# Patient Record
Sex: FEMALE | Race: OTHER | HISPANIC OR LATINO | URBAN - METROPOLITAN AREA
[De-identification: names, ages, dates, MRNs, and addresses within clinical notes are randomized per-mention and may not be internally consistent; named-entity substitution may affect disease eponyms.]

---

## 2018-10-14 ENCOUNTER — INPATIENT (INPATIENT)
Facility: HOSPITAL | Age: 83
LOS: 10 days | Discharge: HOME | DRG: 638 | End: 2018-10-25
Attending: INTERNAL MEDICINE | Admitting: INTERNAL MEDICINE
Payer: MEDICAID

## 2018-10-14 VITALS
OXYGEN SATURATION: 97 % | DIASTOLIC BLOOD PRESSURE: 52 MMHG | TEMPERATURE: 99 F | RESPIRATION RATE: 18 BRPM | SYSTOLIC BLOOD PRESSURE: 111 MMHG | HEART RATE: 80 BPM

## 2018-10-14 DIAGNOSIS — E11.9 TYPE 2 DIABETES MELLITUS WITHOUT COMPLICATIONS: ICD-10-CM

## 2018-10-14 DIAGNOSIS — Z29.9 ENCOUNTER FOR PROPHYLACTIC MEASURES, UNSPECIFIED: ICD-10-CM

## 2018-10-14 PROBLEM — Z00.00 ENCOUNTER FOR PREVENTIVE HEALTH EXAMINATION: Status: ACTIVE | Noted: 2018-10-14

## 2018-10-14 LAB
ACETONE SERPL-MCNC: NEGATIVE — SIGNIFICANT CHANGE UP
ALBUMIN SERPL ELPH-MCNC: 3.2 G/DL — LOW (ref 3.5–5)
ALP SERPL-CCNC: 154 U/L — HIGH (ref 40–120)
ALT FLD-CCNC: 29 U/L DA — SIGNIFICANT CHANGE UP (ref 10–60)
ANION GAP SERPL CALC-SCNC: 9 MMOL/L — SIGNIFICANT CHANGE UP (ref 5–17)
APPEARANCE UR: CLEAR — SIGNIFICANT CHANGE UP
AST SERPL-CCNC: 23 U/L — SIGNIFICANT CHANGE UP (ref 10–40)
BACTERIA # UR AUTO: ABNORMAL /HPF
BASOPHILS # BLD AUTO: 0 K/UL — SIGNIFICANT CHANGE UP (ref 0–0.2)
BASOPHILS NFR BLD AUTO: 0.6 % — SIGNIFICANT CHANGE UP (ref 0–2)
BILIRUB SERPL-MCNC: 0.3 MG/DL — SIGNIFICANT CHANGE UP (ref 0.2–1.2)
BILIRUB UR-MCNC: NEGATIVE — SIGNIFICANT CHANGE UP
BUN SERPL-MCNC: 19 MG/DL — HIGH (ref 7–18)
CALCIUM SERPL-MCNC: 8.9 MG/DL — SIGNIFICANT CHANGE UP (ref 8.4–10.5)
CHLORIDE SERPL-SCNC: 97 MMOL/L — SIGNIFICANT CHANGE UP (ref 96–108)
CO2 SERPL-SCNC: 26 MMOL/L — SIGNIFICANT CHANGE UP (ref 22–31)
COLOR SPEC: YELLOW — SIGNIFICANT CHANGE UP
CREAT SERPL-MCNC: 1.19 MG/DL — SIGNIFICANT CHANGE UP (ref 0.5–1.3)
DIFF PNL FLD: NEGATIVE — SIGNIFICANT CHANGE UP
EOSINOPHIL # BLD AUTO: 0.2 K/UL — SIGNIFICANT CHANGE UP (ref 0–0.5)
EOSINOPHIL NFR BLD AUTO: 2.1 % — SIGNIFICANT CHANGE UP (ref 0–6)
EPI CELLS # UR: SIGNIFICANT CHANGE UP /HPF
GLUCOSE SERPL-MCNC: 549 MG/DL — CRITICAL HIGH (ref 70–99)
GLUCOSE UR QL: 1000 MG/DL
HCT VFR BLD CALC: 46.4 % — HIGH (ref 34.5–45)
HGB BLD-MCNC: 15.4 G/DL — SIGNIFICANT CHANGE UP (ref 11.5–15.5)
KETONES UR-MCNC: NEGATIVE — SIGNIFICANT CHANGE UP
LEUKOCYTE ESTERASE UR-ACNC: NEGATIVE — SIGNIFICANT CHANGE UP
LYMPHOCYTES # BLD AUTO: 1.2 K/UL — SIGNIFICANT CHANGE UP (ref 1–3.3)
LYMPHOCYTES # BLD AUTO: 15.7 % — SIGNIFICANT CHANGE UP (ref 13–44)
MCHC RBC-ENTMCNC: 30.2 PG — SIGNIFICANT CHANGE UP (ref 27–34)
MCHC RBC-ENTMCNC: 33.3 GM/DL — SIGNIFICANT CHANGE UP (ref 32–36)
MCV RBC AUTO: 90.8 FL — SIGNIFICANT CHANGE UP (ref 80–100)
MONOCYTES # BLD AUTO: 0.5 K/UL — SIGNIFICANT CHANGE UP (ref 0–0.9)
MONOCYTES NFR BLD AUTO: 6.6 % — SIGNIFICANT CHANGE UP (ref 2–14)
NEUTROPHILS # BLD AUTO: 6 K/UL — SIGNIFICANT CHANGE UP (ref 1.8–7.4)
NEUTROPHILS NFR BLD AUTO: 75.1 % — SIGNIFICANT CHANGE UP (ref 43–77)
NITRITE UR-MCNC: NEGATIVE — SIGNIFICANT CHANGE UP
PH UR: 5 — SIGNIFICANT CHANGE UP (ref 5–8)
PLATELET # BLD AUTO: 203 K/UL — SIGNIFICANT CHANGE UP (ref 150–400)
POTASSIUM SERPL-MCNC: 4.8 MMOL/L — SIGNIFICANT CHANGE UP (ref 3.5–5.3)
POTASSIUM SERPL-SCNC: 4.8 MMOL/L — SIGNIFICANT CHANGE UP (ref 3.5–5.3)
PROT SERPL-MCNC: 7.3 G/DL — SIGNIFICANT CHANGE UP (ref 6–8.3)
PROT UR-MCNC: NEGATIVE — SIGNIFICANT CHANGE UP
RBC # BLD: 5.11 M/UL — SIGNIFICANT CHANGE UP (ref 3.8–5.2)
RBC # FLD: 11.7 % — SIGNIFICANT CHANGE UP (ref 10.3–14.5)
RBC CASTS # UR COMP ASSIST: SIGNIFICANT CHANGE UP /HPF (ref 0–2)
SODIUM SERPL-SCNC: 132 MMOL/L — LOW (ref 135–145)
SP GR SPEC: 1.01 — SIGNIFICANT CHANGE UP (ref 1.01–1.02)
UROBILINOGEN FLD QL: NEGATIVE — SIGNIFICANT CHANGE UP
WBC # BLD: 8 K/UL — SIGNIFICANT CHANGE UP (ref 3.8–10.5)
WBC # FLD AUTO: 8 K/UL — SIGNIFICANT CHANGE UP (ref 3.8–10.5)
WBC UR QL: SIGNIFICANT CHANGE UP /HPF (ref 0–5)

## 2018-10-14 PROCEDURE — 71045 X-RAY EXAM CHEST 1 VIEW: CPT | Mod: 26

## 2018-10-14 PROCEDURE — 99285 EMERGENCY DEPT VISIT HI MDM: CPT | Mod: 25

## 2018-10-14 RX ORDER — DEXTROSE 50 % IN WATER 50 %
15 SYRINGE (ML) INTRAVENOUS ONCE
Qty: 0 | Refills: 0 | Status: DISCONTINUED | OUTPATIENT
Start: 2018-10-14 | End: 2018-10-25

## 2018-10-14 RX ORDER — DEXTROSE 50 % IN WATER 50 %
12.5 SYRINGE (ML) INTRAVENOUS ONCE
Qty: 0 | Refills: 0 | Status: DISCONTINUED | OUTPATIENT
Start: 2018-10-14 | End: 2018-10-25

## 2018-10-14 RX ORDER — SODIUM CHLORIDE 9 MG/ML
1000 INJECTION INTRAMUSCULAR; INTRAVENOUS; SUBCUTANEOUS
Qty: 0 | Refills: 0 | Status: DISCONTINUED | OUTPATIENT
Start: 2018-10-14 | End: 2018-10-15

## 2018-10-14 RX ORDER — DEXTROSE 50 % IN WATER 50 %
25 SYRINGE (ML) INTRAVENOUS ONCE
Qty: 0 | Refills: 0 | Status: DISCONTINUED | OUTPATIENT
Start: 2018-10-14 | End: 2018-10-25

## 2018-10-14 RX ORDER — ENOXAPARIN SODIUM 100 MG/ML
40 INJECTION SUBCUTANEOUS ONCE
Qty: 0 | Refills: 0 | Status: COMPLETED | OUTPATIENT
Start: 2018-10-14 | End: 2018-10-15

## 2018-10-14 RX ORDER — SODIUM CHLORIDE 9 MG/ML
1000 INJECTION, SOLUTION INTRAVENOUS
Qty: 0 | Refills: 0 | Status: DISCONTINUED | OUTPATIENT
Start: 2018-10-14 | End: 2018-10-25

## 2018-10-14 RX ORDER — INSULIN LISPRO 100/ML
VIAL (ML) SUBCUTANEOUS AT BEDTIME
Qty: 0 | Refills: 0 | Status: DISCONTINUED | OUTPATIENT
Start: 2018-10-14 | End: 2018-10-18

## 2018-10-14 RX ORDER — GLUCAGON INJECTION, SOLUTION 0.5 MG/.1ML
1 INJECTION, SOLUTION SUBCUTANEOUS ONCE
Qty: 0 | Refills: 0 | Status: DISCONTINUED | OUTPATIENT
Start: 2018-10-14 | End: 2018-10-25

## 2018-10-14 RX ORDER — INSULIN LISPRO 100/ML
VIAL (ML) SUBCUTANEOUS
Qty: 0 | Refills: 0 | Status: DISCONTINUED | OUTPATIENT
Start: 2018-10-14 | End: 2018-10-25

## 2018-10-14 RX ORDER — INSULIN LISPRO 100/ML
10 VIAL (ML) SUBCUTANEOUS ONCE
Qty: 0 | Refills: 0 | Status: COMPLETED | OUTPATIENT
Start: 2018-10-14 | End: 2018-10-14

## 2018-10-14 RX ORDER — SODIUM CHLORIDE 9 MG/ML
1000 INJECTION INTRAMUSCULAR; INTRAVENOUS; SUBCUTANEOUS ONCE
Qty: 0 | Refills: 0 | Status: COMPLETED | OUTPATIENT
Start: 2018-10-14 | End: 2018-10-14

## 2018-10-14 RX ADMIN — SODIUM CHLORIDE 4000 MILLILITER(S): 9 INJECTION INTRAMUSCULAR; INTRAVENOUS; SUBCUTANEOUS at 19:15

## 2018-10-14 RX ADMIN — Medication 10 UNIT(S): at 20:55

## 2018-10-14 NOTE — H&P ADULT - NSHPSOCIALHISTORY_GEN_ALL_CORE
Pt denies tobacco, alcohol, or recreational drug use Pt reports 40pk year smoking history- quit 20 years prior  Pt denies alcohol, or recreational drug use

## 2018-10-14 NOTE — H&P ADULT - PROBLEM SELECTOR PLAN 2
[] Previous VTE                                                3  [] Thrombophilia                                             2  [] Lower limb paralysis                                   2    [] Current Cancer                                             2   [x] Immobilization > 24 hrs                              1  [] ICU/CCU stay > 24 hours                             1  [x] Age > 60                                                         1    IMPROVE VTE Score: 2; Lovenox sub q for DVT prophy -c/w atenolol therapy- 100mg daily  -Monitor BP  -Dash diet

## 2018-10-14 NOTE — H&P ADULT - PROBLEM SELECTOR PLAN 1
-Hyperglycemic to 500's on admission  -s/p Humalog 10 units in ED  -Corrected AG  -Monitor FS q 2 for now -Hyperglycemic to 500's on admission  -s/p Humalog 10 units in ED  -Corrected A  -Monitor FS q 2 for now  -Na of 132 2/2 hypoglycemia--> corrected 136  -c/w Sliding Scale, will administer 10 lantus HS   -Diabetic Diet  -f/u HbA1c  -Endo: Dr. Miller -Hyperglycemic to 500's on admission  -s/p Humalog 10 units in ED  -Corrected A  -Monitor FS q 2 for now  -Na of 132 2/2 hypoglycemia--> corrected 136  -c/w Sliding Scale, will administer 5 lantus HS   -c/w IV hydration  -Diabetic Diet  -f/u HbA1c  -f/u AM BMP  -Endo: Dr. Miller -Hyperglycemic to 500's on admission  -s/p Humalog 10 units in ED  -Corrected A  -Monitor FS q 2 for now  -Na of 132 2/2 hypoglycemia--> corrected 136  -c/w Sliding Scale, will administer 5 lantus HS   -c/w IV hydration  -Diabetic Diet  -f/u HbA1c  -f/u AM BMP  -Endo: Dr. Baron

## 2018-10-14 NOTE — ED PROVIDER NOTE - CARE PLAN
Principal Discharge DX:	Type 2 diabetes mellitus with hyperglycemia, without long-term current use of insulin

## 2018-10-14 NOTE — H&P ADULT - PROBLEM SELECTOR PLAN 4
[] Previous VTE                                                3  [] Thrombophilia                                             2  [] Lower limb paralysis                                   2    [] Current Cancer                                             2   [x] Immobilization > 24 hrs                              1  [] ICU/CCU stay > 24 hours                             1  [x] Age > 60                                                         1    IMPROVE VTE Score: 2; Lovenox sub q for DVT prophy

## 2018-10-14 NOTE — H&P ADULT - NSHPPHYSICALEXAM_GEN_ALL_CORE
Vital Signs Last 24 Hrs  T(C): 36.7 (14 Oct 2018 19:26), Max: 37.1 (14 Oct 2018 17:39)  T(F): 98 (14 Oct 2018 19:26), Max: 98.7 (14 Oct 2018 17:39)  HR: 91 (14 Oct 2018 19:26) (80 - 91)  BP: 112/61 (14 Oct 2018 19:26) (111/52 - 112/61)  RR: 18 (14 Oct 2018 19:26) (18 - 18)  SpO2: 98% (14 Oct 2018 19:26) (97% - 98%)

## 2018-10-14 NOTE — ED ADULT NURSE NOTE - NSIMPLEMENTINTERV_GEN_ALL_ED
Implemented All Fall Risk Interventions:  Central to call system. Call bell, personal items and telephone within reach. Instruct patient to call for assistance. Room bathroom lighting operational. Non-slip footwear when patient is off stretcher. Physically safe environment: no spills, clutter or unnecessary equipment. Stretcher in lowest position, wheels locked, appropriate side rails in place. Provide visual cue, wrist band, yellow gown, etc. Monitor gait and stability. Monitor for mental status changes and reorient to person, place, and time. Review medications for side effects contributing to fall risk. Reinforce activity limits and safety measures with patient and family.

## 2018-10-14 NOTE — ED PROVIDER NOTE - OBJECTIVE STATEMENT
84 y/o F pt with a PMHx of DM type II (oral medication no insulin) presents to the ED with complaints of elevated blood glucose at home. Patient states shortness of breath, chest pain, urinary urgency, urinary frequency or any other complaints. NKDA. 82 y/o F pt with a PMHx of DM type II (oral medication no insulin) presents to the ED with complaints of elevated blood glucose at home. Patient states  no shortness of breath, chest pain, urinary urgency, urinary frequency or any other complaints. NKDA.

## 2018-10-14 NOTE — ED ADULT NURSE NOTE - ED STAT RN HANDOFF DETAILS
Fingerstick 78 mg/dl  aware pt.remained  stable. denies  pain. Fingerstick 78 mg/dl  aware with  no new order made, pt.is asymptomatic. no s/s  of hypo and  hyperglycemia  noted. re-check FSBS 121 mg/dl.endorsed  to dia evangelista.pt.not   in distress

## 2018-10-14 NOTE — H&P ADULT - RS GEN PE MLT RESP DETAILS PC
good air movement/no rhonchi/no wheezes/respirations non-labored/clear to auscultation bilaterally/airway patent/no rales/breath sounds equal

## 2018-10-14 NOTE — H&P ADULT - NEUROLOGICAL DETAILS
cranial nerves intact/responds to pain/responds to verbal commands/alert and oriented x 3/sensation intact

## 2018-10-14 NOTE — H&P ADULT - HISTORY OF PRESENT ILLNESS
Pt is an 82 y/o F, from home, with a PMHx of DM type II (oral medication no insulin) who presents to the ED with complaints of elevated blood glucose at home. Pt is an 84 y/o F, from home, with a PMHx of DM type II (oral medication no insulin) who presents to the ED with complaints of elevated blood glucose at home.  Pt is a poor historian and history obtained over the phone from daughter, Chantal, who notes blood glucose was elevated yesterday in the 300's.  Pt continued to take her PO glipized as scheduled.  This AM her glucose was 340 before breakfast.  Pt also complained of urinary urgency and dysuria.  Daughter also notes pt has been drinking a lot of water lately.  Pt denies HA, blurred vision, diaphoresis, weakness, loss of apetite, lethargy/ confusion, or dizziness.  Pt last saw her PCP, Dr. Zarate, 1 mo prior and blood sugars were fine at that time.  Pt was switched from metformin to Glipizide 6mo prior, and sugars have been well controlled.  Pt is compliant with medication.  Pt's diet is poor.      In the ED, pt presents in no acute distress, and vitals WNL

## 2018-10-14 NOTE — H&P ADULT - ASSESSMENT
Pt is an 82 y/o F, from home, with a PMHx of DM type II (oral medication no insulin) who presents to the ED with complaints of elevated blood glucose at home.  In the ED, pt presents in no acute distress, and vitals WNL.  Pt remains afebrile w/o leukocytosis.  Labs sig for glucose of 534. Pt is an 84 y/o F, from home, with a PMHx of DM type II (oral medication no insulin) who presents to the ED with complaints of elevated blood glucose at home.  In the ED, pt presents in no acute distress, and vitals WNL.  Pt remains afebrile w/o leukocytosis.  Labs sig for glucose of 534.      Pt will be admitted to medicine for management of hyperglycemia

## 2018-10-14 NOTE — ED PROVIDER NOTE - MEDICAL DECISION MAKING DETAILS
UTI as a cause of worsening hyperglycemia. Not on insulin and not likely to require fluids and insulin. Likely admission.

## 2018-10-15 DIAGNOSIS — E11.65 TYPE 2 DIABETES MELLITUS WITH HYPERGLYCEMIA: ICD-10-CM

## 2018-10-15 DIAGNOSIS — E78.5 HYPERLIPIDEMIA, UNSPECIFIED: ICD-10-CM

## 2018-10-15 DIAGNOSIS — I10 ESSENTIAL (PRIMARY) HYPERTENSION: ICD-10-CM

## 2018-10-15 LAB
ANION GAP SERPL CALC-SCNC: 7 MMOL/L — SIGNIFICANT CHANGE UP (ref 5–17)
BUN SERPL-MCNC: 13 MG/DL — SIGNIFICANT CHANGE UP (ref 7–18)
CALCIUM SERPL-MCNC: 8.4 MG/DL — SIGNIFICANT CHANGE UP (ref 8.4–10.5)
CHLORIDE SERPL-SCNC: 110 MMOL/L — HIGH (ref 96–108)
CHOLEST SERPL-MCNC: 178 MG/DL — SIGNIFICANT CHANGE UP (ref 10–199)
CO2 SERPL-SCNC: 25 MMOL/L — SIGNIFICANT CHANGE UP (ref 22–31)
CREAT SERPL-MCNC: 0.77 MG/DL — SIGNIFICANT CHANGE UP (ref 0.5–1.3)
CULTURE RESULTS: NO GROWTH — SIGNIFICANT CHANGE UP
GLUCOSE BLDC GLUCOMTR-MCNC: 121 MG/DL — HIGH (ref 70–99)
GLUCOSE BLDC GLUCOMTR-MCNC: 126 MG/DL — HIGH (ref 70–99)
GLUCOSE BLDC GLUCOMTR-MCNC: 138 MG/DL — HIGH (ref 70–99)
GLUCOSE BLDC GLUCOMTR-MCNC: 164 MG/DL — HIGH (ref 70–99)
GLUCOSE BLDC GLUCOMTR-MCNC: 78 MG/DL — SIGNIFICANT CHANGE UP (ref 70–99)
GLUCOSE SERPL-MCNC: 121 MG/DL — HIGH (ref 70–99)
HBA1C BLD-MCNC: 15 % — HIGH (ref 4–5.6)
HCT VFR BLD CALC: 41.5 % — SIGNIFICANT CHANGE UP (ref 34.5–45)
HDLC SERPL-MCNC: 36 MG/DL — LOW
HGB BLD-MCNC: 13.9 G/DL — SIGNIFICANT CHANGE UP (ref 11.5–15.5)
LIPID PNL WITH DIRECT LDL SERPL: 114 MG/DL — SIGNIFICANT CHANGE UP
MAGNESIUM SERPL-MCNC: 1.7 MG/DL — SIGNIFICANT CHANGE UP (ref 1.6–2.6)
MCHC RBC-ENTMCNC: 30.6 PG — SIGNIFICANT CHANGE UP (ref 27–34)
MCHC RBC-ENTMCNC: 33.6 GM/DL — SIGNIFICANT CHANGE UP (ref 32–36)
MCV RBC AUTO: 91.3 FL — SIGNIFICANT CHANGE UP (ref 80–100)
PHOSPHATE SERPL-MCNC: 3 MG/DL — SIGNIFICANT CHANGE UP (ref 2.5–4.5)
PLATELET # BLD AUTO: 190 K/UL — SIGNIFICANT CHANGE UP (ref 150–400)
POTASSIUM SERPL-MCNC: 5 MMOL/L — SIGNIFICANT CHANGE UP (ref 3.5–5.3)
POTASSIUM SERPL-SCNC: 5 MMOL/L — SIGNIFICANT CHANGE UP (ref 3.5–5.3)
RBC # BLD: 4.54 M/UL — SIGNIFICANT CHANGE UP (ref 3.8–5.2)
RBC # FLD: 11.9 % — SIGNIFICANT CHANGE UP (ref 10.3–14.5)
SODIUM SERPL-SCNC: 142 MMOL/L — SIGNIFICANT CHANGE UP (ref 135–145)
SPECIMEN SOURCE: SIGNIFICANT CHANGE UP
TOTAL CHOLESTEROL/HDL RATIO MEASUREMENT: 4.9 RATIO — SIGNIFICANT CHANGE UP (ref 3.3–7.1)
TRIGL SERPL-MCNC: 138 MG/DL — SIGNIFICANT CHANGE UP (ref 10–149)
TSH SERPL-MCNC: 0.94 UU/ML — SIGNIFICANT CHANGE UP (ref 0.34–4.82)
VIT B12 SERPL-MCNC: 310 PG/ML — SIGNIFICANT CHANGE UP (ref 232–1245)
WBC # BLD: 8.9 K/UL — SIGNIFICANT CHANGE UP (ref 3.8–10.5)
WBC # FLD AUTO: 8.9 K/UL — SIGNIFICANT CHANGE UP (ref 3.8–10.5)

## 2018-10-15 RX ORDER — SIMVASTATIN 20 MG/1
1 TABLET, FILM COATED ORAL
Qty: 0 | Refills: 0 | COMMUNITY

## 2018-10-15 RX ORDER — SIMVASTATIN 20 MG/1
20 TABLET, FILM COATED ORAL AT BEDTIME
Qty: 0 | Refills: 0 | Status: DISCONTINUED | OUTPATIENT
Start: 2018-10-15 | End: 2018-10-25

## 2018-10-15 RX ORDER — ENOXAPARIN SODIUM 100 MG/ML
40 INJECTION SUBCUTANEOUS DAILY
Qty: 0 | Refills: 0 | Status: DISCONTINUED | OUTPATIENT
Start: 2018-10-15 | End: 2018-10-25

## 2018-10-15 RX ORDER — ATENOLOL 25 MG/1
50 TABLET ORAL DAILY
Qty: 0 | Refills: 0 | Status: DISCONTINUED | OUTPATIENT
Start: 2018-10-15 | End: 2018-10-25

## 2018-10-15 RX ORDER — INSULIN GLARGINE 100 [IU]/ML
5 INJECTION, SOLUTION SUBCUTANEOUS AT BEDTIME
Qty: 0 | Refills: 0 | Status: DISCONTINUED | OUTPATIENT
Start: 2018-10-15 | End: 2018-10-16

## 2018-10-15 RX ORDER — ATENOLOL 25 MG/1
1 TABLET ORAL
Qty: 0 | Refills: 0 | COMMUNITY

## 2018-10-15 RX ADMIN — ENOXAPARIN SODIUM 40 MILLIGRAM(S): 100 INJECTION SUBCUTANEOUS at 01:00

## 2018-10-15 RX ADMIN — SIMVASTATIN 20 MILLIGRAM(S): 20 TABLET, FILM COATED ORAL at 23:03

## 2018-10-15 RX ADMIN — Medication 2: at 01:00

## 2018-10-15 RX ADMIN — ENOXAPARIN SODIUM 40 MILLIGRAM(S): 100 INJECTION SUBCUTANEOUS at 16:03

## 2018-10-15 RX ADMIN — SODIUM CHLORIDE 150 MILLILITER(S): 9 INJECTION INTRAMUSCULAR; INTRAVENOUS; SUBCUTANEOUS at 02:17

## 2018-10-15 RX ADMIN — INSULIN GLARGINE 5 UNIT(S): 100 INJECTION, SOLUTION SUBCUTANEOUS at 01:04

## 2018-10-15 NOTE — PROGRESS NOTE ADULT - SUBJECTIVE AND OBJECTIVE BOX
Pt is an 82 y/o F, from home, with a PMHx of DM type II (oral medication no insulin) who presents to the ED with complaints of elevated blood glucose at home.  Pt is a poor historian and history obtained over the phone from daughter, Chantal, who notes blood glucose was elevated yesterday in the 300's.  Pt continued to take her PO glipized as scheduled.  This AM her glucose was 340 before breakfast.  Pt also complained of urinary urgency and dysuria.  Daughter also notes pt has been drinking a lot of water lately.  Pt denies HA, blurred vision, diaphoresis, weakness, loss of apetite, lethargy/ confusion, or dizziness.  Pt last saw her PCP, Dr. aZrate, 1 mo prior and blood sugars were fine at that time.  Pt was switched from metformin to Glipizide 6mo prior, and sugars have been well controlled.  Pt is compliant with medication.  Pt's diet is poor.      In the ED, pt presents in no acute distress, and vitals WNL    Review of Systems:  Other Review of Systems: All other review of systems negative, except as noted in HPI	      pt seen in ed [x  ], reg med floor [   ], bed [ x ], chair at bedside [   ], a+o x3 [ x ], lethargic [  ],  nad [ x ]      Allergies    No Known Allergies        Vitals    T(F): 98.5 (10-15-18 @ 11:03), Max: 98.7 (10-14-18 @ 17:39)  HR: 66 (10-15-18 @ 11:03) (60 - 91)  BP: 102/49 (10-15-18 @ 11:03) (90/48 - 112/61)  RR: 18 (10-15-18 @ 11:03) (18 - 18)  SpO2: 97% (10-15-18 @ 11:03) (97% - 100%)  Wt(kg): --  CAPILLARY BLOOD GLUCOSE      POCT Blood Glucose.: 138 mg/dL (15 Oct 2018 11:13)      Labs                          13.9   8.9   )-----------( 190      ( 15 Oct 2018 06:47 )             41.5       10-15    142  |  110<H>  |  13  ----------------------------<  121<H>  5.0   |  25  |  0.77    Ca    8.4      15 Oct 2018 06:47  Phos  3.0     10-15  Mg     1.7     10-15    TPro  7.3  /  Alb  3.2<L>  /  TBili  0.3  /  DBili  x   /  AST  23  /  ALT  29  /  AlkPhos  154<H>  10-14                Radiology Results      Meds    MEDICATIONS  (STANDING):  ATENolol  Tablet 50 milliGRAM(s) Oral daily  dextrose 5%. 1000 milliLiter(s) (50 mL/Hr) IV Continuous <Continuous>  dextrose 50% Injectable 12.5 Gram(s) IV Push once  dextrose 50% Injectable 25 Gram(s) IV Push once  dextrose 50% Injectable 25 Gram(s) IV Push once  enoxaparin Injectable 40 milliGRAM(s) SubCutaneous daily  insulin glargine Injectable (LANTUS) 5 Unit(s) SubCutaneous at bedtime  insulin lispro (HumaLOG) corrective regimen sliding scale   SubCutaneous three times a day before meals  insulin lispro (HumaLOG) corrective regimen sliding scale   SubCutaneous at bedtime  simvastatin 20 milliGRAM(s) Oral at bedtime  sodium chloride 0.9%. 1000 milliLiter(s) (150 mL/Hr) IV Continuous <Continuous>      MEDICATIONS  (PRN):  dextrose 40% Gel 15 Gram(s) Oral once PRN Blood Glucose LESS THAN 70 milliGRAM(s)/deciLiter  glucagon  Injectable 1 milliGRAM(s) IntraMuscular once PRN Glucose <70 milliGRAM(s)/deciLiter      Physical Exam    Neuro :  no focal deficits  Respiratory: CTA B/L  CV: RRR, S1S2, no murmurs,   Abdominal: Soft, NT, ND +BS,  Extremities: No edema, + peripheral pulses    ASSESSMENT    Type 2 diabetes mellitus with hyperglycemia  DM type 2 (diabetes mellitus, type 2)  No significant past surgical history      PLAN Pt is an 82 y/o F, from home, with a PMHx of DM type II (oral medication no insulin) who presents to the ED with complaints of elevated blood glucose at home.  Pt is a poor historian and history obtained over the phone from daughter, Chantal, who notes blood glucose was elevated yesterday in the 300's.  Pt continued to take her PO glipized as scheduled.  This AM her glucose was 340 before breakfast.  Pt also complained of urinary urgency and dysuria.  Daughter also notes pt has been drinking a lot of water lately.  Pt denies HA, blurred vision, diaphoresis, weakness, loss of apetite, lethargy/ confusion, or dizziness.  Pt last saw her PCP, Dr. Zarate, 1 mo prior and blood sugars were fine at that time.  Pt was switched from metformin to Glipizide 6mo prior, and sugars have been well controlled.  Pt is compliant with medication.  Pt's diet is poor.      In the ED, pt presents in no acute distress, and vitals WNL    Review of Systems:  Other Review of Systems: All other review of systems negative, except as noted in HPI	      pt seen in ed [x  ], reg med floor [   ], bed [ x ], chair at bedside [   ], a+o x3 [ x ], lethargic [  ],  nad [ x ]      Allergies    No Known Allergies        Vitals    T(F): 98.5 (10-15-18 @ 11:03), Max: 98.7 (10-14-18 @ 17:39)  HR: 66 (10-15-18 @ 11:03) (60 - 91)  BP: 102/49 (10-15-18 @ 11:03) (90/48 - 112/61)  RR: 18 (10-15-18 @ 11:03) (18 - 18)  SpO2: 97% (10-15-18 @ 11:03) (97% - 100%)  Wt(kg): --  CAPILLARY BLOOD GLUCOSE      POCT Blood Glucose.: 138 mg/dL (15 Oct 2018 11:13)      Labs                          13.9   8.9   )-----------( 190      ( 15 Oct 2018 06:47 )             41.5       10-15    142  |  110<H>  |  13  ----------------------------<  121<H>  5.0   |  25  |  0.77    Ca    8.4      15 Oct 2018 06:47  Phos  3.0     10-15  Mg     1.7     10-15    TPro  7.3  /  Alb  3.2<L>  /  TBili  0.3  /  DBili  x   /  AST  23  /  ALT  29  /  AlkPhos  154<H>  10-14                Radiology Results      Meds    MEDICATIONS  (STANDING):  ATENolol  Tablet 50 milliGRAM(s) Oral daily  dextrose 5%. 1000 milliLiter(s) (50 mL/Hr) IV Continuous <Continuous>  dextrose 50% Injectable 12.5 Gram(s) IV Push once  dextrose 50% Injectable 25 Gram(s) IV Push once  dextrose 50% Injectable 25 Gram(s) IV Push once  enoxaparin Injectable 40 milliGRAM(s) SubCutaneous daily  insulin glargine Injectable (LANTUS) 5 Unit(s) SubCutaneous at bedtime  insulin lispro (HumaLOG) corrective regimen sliding scale   SubCutaneous three times a day before meals  insulin lispro (HumaLOG) corrective regimen sliding scale   SubCutaneous at bedtime  simvastatin 20 milliGRAM(s) Oral at bedtime  sodium chloride 0.9%. 1000 milliLiter(s) (150 mL/Hr) IV Continuous <Continuous>      MEDICATIONS  (PRN):  dextrose 40% Gel 15 Gram(s) Oral once PRN Blood Glucose LESS THAN 70 milliGRAM(s)/deciLiter  glucagon  Injectable 1 milliGRAM(s) IntraMuscular once PRN Glucose <70 milliGRAM(s)/deciLiter      Physical Exam    Neuro :  no focal deficits  Respiratory: CTA B/L  CV: RRR, S1S2, no murmurs,   Abdominal: Soft, NT, ND +BS,  Extremities: No edema, + peripheral pulses    ASSESSMENT    uncontrolled dm  h/o DM type 2 (diabetes mellitus, type 2)  No significant past surgical history      PLAN    cont lantus and humalog  endo cons  serum glucose improving  cont current meds

## 2018-10-15 NOTE — ED ADULT NURSE REASSESSMENT NOTE - NS ED NURSE REASSESS COMMENT FT1
receive pt awake alert oriented not in distress with saline lock intact no redness no swelling noted with daughter at  bedside.waiting for bed.

## 2018-10-15 NOTE — CONSULT NOTE ADULT - SUBJECTIVE AND OBJECTIVE BOX
diabetes consult  83 yr h/f with h/o diabetes  admitted with severe hyperglycemia  started on fluids/insulin  initial hpi from residents note  HPI:     Pt is an 84 y/o F, from home, with a PMHx of DM type II (oral medication no insulin) who presents to the ED with complaints of elevated blood glucose at home.  Pt is a poor historian and history obtained over the phone from daughter, Chantal, who notes blood glucose was elevated yesterday in the 300's.  Pt continued to take her PO glipized as scheduled.  This AM her glucose was 340 before breakfast.  Pt also complained of urinary urgency and dysuria.  Daughter also notes pt has been drinking a lot of water lately.  Pt denies HA, blurred vision, diaphoresis, weakness, loss of apetite, lethargy/ confusion, or dizziness.  Pt last saw her PCP, Dr. Zarate, 1 mo prior and blood sugars were fine at that time.  Pt was switched from metformin to Glipizide 6mo prior, and sugars have been well controlled.  Pt is compliant with medication.  Pt's diet is poor.      In the ED, pt presents in no acute distress, and vitals WNL (14 Oct 2018 21:02)      PAST MEDICAL & SURGICAL HISTORY:  DM type 2 (diabetes mellitus, type 2)  No significant past surgical history      No Known Allergies      ATENolol  Tablet 50 milliGRAM(s) Oral daily  dextrose 40% Gel 15 Gram(s) Oral once PRN  dextrose 5%. 1000 milliLiter(s) IV Continuous <Continuous>  dextrose 50% Injectable 12.5 Gram(s) IV Push once  dextrose 50% Injectable 25 Gram(s) IV Push once  dextrose 50% Injectable 25 Gram(s) IV Push once  enoxaparin Injectable 40 milliGRAM(s) SubCutaneous daily  glucagon  Injectable 1 milliGRAM(s) IntraMuscular once PRN  insulin glargine Injectable (LANTUS) 5 Unit(s) SubCutaneous at bedtime  insulin lispro (HumaLOG) corrective regimen sliding scale   SubCutaneous three times a day before meals  insulin lispro (HumaLOG) corrective regimen sliding scale   SubCutaneous at bedtime  simvastatin 20 milliGRAM(s) Oral at bedtime      Social Hx:    FAMILY HISTORY:  Family history of diabetes mellitus (Sibling)      REVIEW OF SYSTEMS:  /WT CHANGE?  GM ?FAMILY DAUGHTER  diet ?dependent      CONSTITUTIONAL: No weakness, fevers or chills  EYES/ENT: No visual changes;  No vertigo or throat pain   NECK: No pain or stiffness  RESPIRATORY: No cough, wheezing, hemoptysis; No shortness of breath  CARDIOVASCULAR: No chest pain or palpitations  GASTROINTESTINAL: No abdominal or epigastric pain. No nausea, vomiting, or hematemesis; No diarrhea or constipation. No melena or hematochezia.  GENITOURINARY: No dysuria, frequency or hematuria  NEUROLOGICAL: No numbness or weakness  SKIN: No itching, burning, rashes, or lesions   All other review of systems is negative unless indicated above.  PHYSICAL EXAM:    Vital Signs Last 24 Hrs  T(C): 37 (15 Oct 2018 15:50), Max: 37 (15 Oct 2018 00:53)  T(F): 98.6 (15 Oct 2018 15:50), Max: 98.6 (15 Oct 2018 00:53)  HR: 71 (15 Oct 2018 15:50) (60 - 72)  BP: 134/81 (15 Oct 2018 15:50) (90/48 - 134/81)  BP(mean): --  RR: 18 (15 Oct 2018 15:50) (18 - 18)  SpO2: 98% (15 Oct 2018 15:50) (97% - 100%)    Constitutional:    HEENT: elderly female  awake  lungs ae fair  cardia reg  abd soft  neuro moves limbs/detailed exam deferred    Neck:  [  ] Supple  [  ]Lymphadenopathy  [  ] JVD   [  ]No JVD  [  ] Masses   [  ] WNL    CHEST/Respiratory:  [  ] Rales      [  ] Rhonchi      [  ] Wheezing       [  ] Chest Tenderness  [  ]Clear to auscultation    Cardiovascular:  [  ]S1 S2  [  ] Reg  [  ] Irreg   [  ] Murmurs  [  ]Systolic [  ]Diastolic  [  ]No Murmur    Abdomen:  [  ]  Bowel Sounds   [  ] Soft           [  ] ABD Distention  [  ] Tenderness  [  ] Organomegaly                        [  ] Guarding Rigidity  [  ] No Guarding Rigidity  [  ] Rebound Tenderness [  ] No Rebound Tenderness    Extremities: [  ] Edema  [  ] No edema  [  ] Clubbing   [  ] Cyanosis  [  ] Palpable peripheral pulses                        [  ] No Tender Calf muscles  [  ] Tender Calf muscles    Neurological:  [  ] Alert  [  ] Awake  [  ] Oriented  x                              [  ] Confused    Skin:  [  ] Thrombophlebitis  [  ] Rashes  [  ] Dry  [  ] Ulcers    Ortho:  [  ] Joint Swelling  [  ] Joint erythema [  ] DJD [  ] Increased temp. to touch      LABS/DIAGNOSTIC TESTS                          13.9   8.9   )-----------( 190      ( 15 Oct 2018 06:47 )             41.5     WBC Count: 8.9 K/uL (10-15 @ 06:47)  WBC Count: 8.0 K/uL (10-14 @ 19:11)      10-15    142  |  110<H>  |  13  ----------------------------<  121<H>  5.0   |  25  |  0.77    Ca    8.4      15 Oct 2018 06:47  Phos  3.0     10-15  Mg     1.7     10-15    TPro  7.3  /  Alb  3.2<L>  /  TBili  0.3  /  DBili  x   /  AST  23  /  ALT  29  /  AlkPhos  154<H>  10-14      Urinalysis Basic - ( 14 Oct 2018 20:12 )    Color: Yellow / Appearance: Clear / S.010 / pH: x  Gluc: x / Ketone: Negative  / Bili: Negative / Urobili: Negative   Blood: x / Protein: Negative / Nitrite: Negative   Leuk Esterase: Negative / RBC: 0-2 /HPF / WBC 0-2 /HPF   Sq Epi: x / Non Sq Epi: Few /HPF / Bacteria: Trace /HPF        LIVER FUNCTIONS - ( 14 Oct 2018 19:11 )  Alb: 3.2 g/dL / Pro: 7.3 g/dL / ALK PHOS: 154 U/L / ALT: 29 U/L DA / AST: 23 U/L / GGT: x                 LACTATE:      CULTURES:   ATENolol  Tablet 50 milliGRAM(s) Oral daily  dextrose 40% Gel 15 Gram(s) Oral once PRN  dextrose 5%. 1000 milliLiter(s) IV Continuous <Continuous>  dextrose 50% Injectable 12.5 Gram(s) IV Push once  dextrose 50% Injectable 25 Gram(s) IV Push once  dextrose 50% Injectable 25 Gram(s) IV Push once  enoxaparin Injectable 40 milliGRAM(s) SubCutaneous daily  glucagon  Injectable 1 milliGRAM(s) IntraMuscular once PRN  insulin glargine Injectable (LANTUS) 5 Unit(s) SubCutaneous at bedtime  insulin lispro (HumaLOG) corrective regimen sliding scale   SubCutaneous three times a day before meals  insulin lispro (HumaLOG) corrective regimen sliding scale   SubCutaneous at bedtime  simvastatin 20 milliGRAM(s) Oral at bedtime      RADIOLOGY    CXR: diabetes consult  83 yr h/f with h/o diabetes  admitted with severe hyperglycemia  started on fluids/insulin  initial hpi from residents note  HPI:     Pt is an 82 y/o F, from home, with a PMHx of DM type II (oral medication no insulin) who presents to the ED with complaints of elevated blood glucose at home.  Pt is a poor historian and history obtained over the phone from daughter, Chantal, who notes blood glucose was elevated yesterday in the 300's.  Pt continued to take her PO glipized as scheduled.  This AM her glucose was 340 before breakfast.  Pt also complained of urinary urgency and dysuria.  Daughter also notes pt has been drinking a lot of water lately.  Pt denies HA, blurred vision, diaphoresis, weakness, loss of apetite, lethargy/ confusion, or dizziness.  Pt last saw her PCP, Dr. Zarate, 1 mo prior and blood sugars were fine at that time.  Pt was switched from metformin to Glipizide 6mo prior, and sugars have been well controlled.  Pt is compliant with medication.  Pt's diet is poor.      In the ED, pt presents in no acute distress, and vitals WNL (14 Oct 2018 21:02)      PAST MEDICAL & SURGICAL HISTORY:  DM type 2 (diabetes mellitus, type 2)  No significant past surgical history      No Known Allergies      ATENolol  Tablet 50 milliGRAM(s) Oral daily  dextrose 40% Gel 15 Gram(s) Oral once PRN  dextrose 5%. 1000 milliLiter(s) IV Continuous <Continuous>  dextrose 50% Injectable 12.5 Gram(s) IV Push once  dextrose 50% Injectable 25 Gram(s) IV Push once  dextrose 50% Injectable 25 Gram(s) IV Push once  enoxaparin Injectable 40 milliGRAM(s) SubCutaneous daily  glucagon  Injectable 1 milliGRAM(s) IntraMuscular once PRN  insulin glargine Injectable (LANTUS) 5 Unit(s) SubCutaneous at bedtime  insulin lispro (HumaLOG) corrective regimen sliding scale   SubCutaneous three times a day before meals  insulin lispro (HumaLOG) corrective regimen sliding scale   SubCutaneous at bedtime  simvastatin 20 milliGRAM(s) Oral at bedtime      Social Hx:    FAMILY HISTORY:  Family history of diabetes mellitus (Sibling)      REVIEW OF SYSTEMS:  /WT CHANGE?  GM ?FAMILY DAUGHTER  diet ?dependent  lety good  no abd pain  legally blind    CONSTITUTIONAL: No weakness, fevers or chills  EYES/ENT: No visual changes;  No vertigo or throat pain   NECK: No pain or stiffness  RESPIRATORY: No cough, wheezing, hemoptysis; No shortness of breath  CARDIOVASCULAR: No chest pain or palpitations  GASTROINTESTINAL: No abdominal or epigastric pain. No nausea, vomiting, or hematemesis; No diarrhea or constipation. No melena or hematochezia.  GENITOURINARY: No dysuria, frequency or hematuria  NEUROLOGICAL: No numbness or weakness  SKIN: No itching, burning, rashes, or lesions   All other review of systems is negative unless indicated above.  PHYSICAL EXAM:    Vital Signs Last 24 Hrs  T(C): 37 (15 Oct 2018 15:50), Max: 37 (15 Oct 2018 00:53)  T(F): 98.6 (15 Oct 2018 15:50), Max: 98.6 (15 Oct 2018 00:53)  HR: 71 (15 Oct 2018 15:50) (60 - 72)  BP: 134/81 (15 Oct 2018 15:50) (90/48 - 134/81)  BP(mean): --  RR: 18 (15 Oct 2018 15:50) (18 - 18)  SpO2: 98% (15 Oct 2018 15:50) (97% - 100%)    Constitutional:    HEENT: elderly female  blind/obese  awake  lungs ae fair  cardia reg  abd soft  neuro moves limbs/detailed exam deferred    Neck:  [  ] Supple  [  ]Lymphadenopathy  [  ] JVD   [  ]No JVD  [  ] Masses   [  ] WNL    CHEST/Respiratory:  [  ] Rales      [  ] Rhonchi      [  ] Wheezing       [  ] Chest Tenderness  [  ]Clear to auscultation    Cardiovascular:  [  ]S1 S2  [  ] Reg  [  ] Irreg   [  ] Murmurs  [  ]Systolic [  ]Diastolic  [  ]No Murmur    Abdomen:  [  ]  Bowel Sounds   [  ] Soft           [  ] ABD Distention  [  ] Tenderness  [  ] Organomegaly                        [  ] Guarding Rigidity  [  ] No Guarding Rigidity  [  ] Rebound Tenderness [  ] No Rebound Tenderness    Extremities: [  ] Edema  [  ] No edema  [  ] Clubbing   [  ] Cyanosis  [  ] Palpable peripheral pulses                        [  ] No Tender Calf muscles  [  ] Tender Calf muscles    Neurological:  [  ] Alert  [  ] Awake  [  ] Oriented  x                              [  ] Confused    Skin:  [  ] Thrombophlebitis  [  ] Rashes  [  ] Dry  [  ] Ulcers    Ortho:  [  ] Joint Swelling  [  ] Joint erythema [  ] DJD [  ] Increased temp. to touch      LABS/DIAGNOSTIC TESTS                          13.9   8.9   )-----------( 190      ( 15 Oct 2018 06:47 )             41.5     WBC Count: 8.9 K/uL (10-15 @ 06:47)  WBC Count: 8.0 K/uL (10-14 @ 19:11)      10-15    142  |  110<H>  |  13  ----------------------------<  121<H>  5.0   |  25  |  0.77    Ca    8.4      15 Oct 2018 06:47  Phos  3.0     10-15  Mg     1.7     10-15    TPro  7.3  /  Alb  3.2<L>  /  TBili  0.3  /  DBili  x   /  AST  23  /  ALT  29  /  AlkPhos  154<H>  10-14      Urinalysis Basic - ( 14 Oct 2018 20:12 )    Color: Yellow / Appearance: Clear / S.010 / pH: x  Gluc: x / Ketone: Negative  / Bili: Negative / Urobili: Negative   Blood: x / Protein: Negative / Nitrite: Negative   Leuk Esterase: Negative / RBC: 0-2 /HPF / WBC 0-2 /HPF   Sq Epi: x / Non Sq Epi: Few /HPF / Bacteria: Trace /HPF        LIVER FUNCTIONS - ( 14 Oct 2018 19:11 )  Alb: 3.2 g/dL / Pro: 7.3 g/dL / ALK PHOS: 154 U/L / ALT: 29 U/L DA / AST: 23 U/L / GGT: x                 LACTATE:      CULTURES:   ATENolol  Tablet 50 milliGRAM(s) Oral daily  dextrose 40% Gel 15 Gram(s) Oral once PRN  dextrose 5%. 1000 milliLiter(s) IV Continuous <Continuous>  dextrose 50% Injectable 12.5 Gram(s) IV Push once  dextrose 50% Injectable 25 Gram(s) IV Push once  dextrose 50% Injectable 25 Gram(s) IV Push once  enoxaparin Injectable 40 milliGRAM(s) SubCutaneous daily  glucagon  Injectable 1 milliGRAM(s) IntraMuscular once PRN  insulin glargine Injectable (LANTUS) 5 Unit(s) SubCutaneous at bedtime  insulin lispro (HumaLOG) corrective regimen sliding scale   SubCutaneous three times a day before meals  insulin lispro (HumaLOG) corrective regimen sliding scale   SubCutaneous at bedtime  simvastatin 20 milliGRAM(s) Oral at bedtime      RADIOLOGY    CXR:

## 2018-10-15 NOTE — CONSULT NOTE ADULT - ASSESSMENT
1.diabetes mellitus  uncontrolled/aic 15?  sugars much improved with low dose lantus/humalog  ?DIETARY NON COMPLIANCE  BASED ON AIC PT WILL NEED INSULIN TO IMPROVE DIABETIC CONTROL  EDUCATION CAREGIVER  TITRATE INSULIN LANTUS/HUMALOG TO KEEP SUGARS 100-180 1.diabetes mellitus  uncontrolled/aic 15?  pt legally blind/dependent  sugars much improved with low dose lantus/humalog  ?DIETARY NON COMPLIANCE  BASED ON AIC PT WILL NEED INSULIN TO IMPROVE DIABETIC CONTROL  EDUCATION CAREGIVER  TITRATE INSULIN LANTUS/HUMALOG TO KEEP SUGARS 100-180

## 2018-10-16 LAB
ANION GAP SERPL CALC-SCNC: 7 MMOL/L — SIGNIFICANT CHANGE UP (ref 5–17)
BUN SERPL-MCNC: 7 MG/DL — SIGNIFICANT CHANGE UP (ref 7–18)
CALCIUM SERPL-MCNC: 8.6 MG/DL — SIGNIFICANT CHANGE UP (ref 8.4–10.5)
CHLORIDE SERPL-SCNC: 108 MMOL/L — SIGNIFICANT CHANGE UP (ref 96–108)
CO2 SERPL-SCNC: 25 MMOL/L — SIGNIFICANT CHANGE UP (ref 22–31)
CREAT SERPL-MCNC: 0.72 MG/DL — SIGNIFICANT CHANGE UP (ref 0.5–1.3)
GLUCOSE BLDC GLUCOMTR-MCNC: 181 MG/DL — HIGH (ref 70–99)
GLUCOSE BLDC GLUCOMTR-MCNC: 181 MG/DL — HIGH (ref 70–99)
GLUCOSE BLDC GLUCOMTR-MCNC: 191 MG/DL — HIGH (ref 70–99)
GLUCOSE BLDC GLUCOMTR-MCNC: 245 MG/DL — HIGH (ref 70–99)
GLUCOSE BLDC GLUCOMTR-MCNC: 252 MG/DL — HIGH (ref 70–99)
GLUCOSE BLDC GLUCOMTR-MCNC: 292 MG/DL — HIGH (ref 70–99)
GLUCOSE SERPL-MCNC: 187 MG/DL — HIGH (ref 70–99)
HCT VFR BLD CALC: 43.9 % — SIGNIFICANT CHANGE UP (ref 34.5–45)
HGB BLD-MCNC: 14.6 G/DL — SIGNIFICANT CHANGE UP (ref 11.5–15.5)
MCHC RBC-ENTMCNC: 30.3 PG — SIGNIFICANT CHANGE UP (ref 27–34)
MCHC RBC-ENTMCNC: 33.2 GM/DL — SIGNIFICANT CHANGE UP (ref 32–36)
MCV RBC AUTO: 91.4 FL — SIGNIFICANT CHANGE UP (ref 80–100)
PLATELET # BLD AUTO: 191 K/UL — SIGNIFICANT CHANGE UP (ref 150–400)
POTASSIUM SERPL-MCNC: 4.4 MMOL/L — SIGNIFICANT CHANGE UP (ref 3.5–5.3)
POTASSIUM SERPL-SCNC: 4.4 MMOL/L — SIGNIFICANT CHANGE UP (ref 3.5–5.3)
RBC # BLD: 4.8 M/UL — SIGNIFICANT CHANGE UP (ref 3.8–5.2)
RBC # FLD: 12 % — SIGNIFICANT CHANGE UP (ref 10.3–14.5)
SODIUM SERPL-SCNC: 140 MMOL/L — SIGNIFICANT CHANGE UP (ref 135–145)
WBC # BLD: 8.1 K/UL — SIGNIFICANT CHANGE UP (ref 3.8–10.5)
WBC # FLD AUTO: 8.1 K/UL — SIGNIFICANT CHANGE UP (ref 3.8–10.5)

## 2018-10-16 RX ORDER — INSULIN GLARGINE 100 [IU]/ML
12 INJECTION, SOLUTION SUBCUTANEOUS AT BEDTIME
Qty: 0 | Refills: 0 | Status: DISCONTINUED | OUTPATIENT
Start: 2018-10-16 | End: 2018-10-18

## 2018-10-16 RX ADMIN — Medication 3: at 14:34

## 2018-10-16 RX ADMIN — INSULIN GLARGINE 5 UNIT(S): 100 INJECTION, SOLUTION SUBCUTANEOUS at 00:43

## 2018-10-16 RX ADMIN — INSULIN GLARGINE 12 UNIT(S): 100 INJECTION, SOLUTION SUBCUTANEOUS at 22:07

## 2018-10-16 RX ADMIN — SIMVASTATIN 20 MILLIGRAM(S): 20 TABLET, FILM COATED ORAL at 22:07

## 2018-10-16 RX ADMIN — ENOXAPARIN SODIUM 40 MILLIGRAM(S): 100 INJECTION SUBCUTANEOUS at 14:33

## 2018-10-16 RX ADMIN — Medication 1: at 09:06

## 2018-10-16 RX ADMIN — Medication 1: at 17:55

## 2018-10-16 RX ADMIN — Medication 0: at 00:43

## 2018-10-16 RX ADMIN — ATENOLOL 50 MILLIGRAM(S): 25 TABLET ORAL at 05:43

## 2018-10-16 NOTE — PROGRESS NOTE ADULT - SUBJECTIVE AND OBJECTIVE BOX
Patient is a 83y old  Female who presents with a chief complaint of Hyperglycemia (15 Oct 2018 19:29)    pt seen in ed [x  ], reg med floor [   ], bed [ x ], chair at bedside [   ], a+o x3 [ x ], lethargic [  ],  nad [ x ]      Allergies    No Known Allergies        Vitals    T(F): 97.6 (10-16-18 @ 05:37), Max: 98.6 (10-15-18 @ 15:50)  HR: 78 (10-16-18 @ 05:37) (66 - 78)  BP: 146/70 (10-16-18 @ 05:37) (102/49 - 148/78)  RR: 18 (10-16-18 @ 05:37) (16 - 18)  SpO2: 100% (10-16-18 @ 05:37) (97% - 100%)  Wt(kg): --  CAPILLARY BLOOD GLUCOSE      POCT Blood Glucose.: 181 mg/dL (16 Oct 2018 08:11)      Labs                          14.6   8.1   )-----------( 191      ( 16 Oct 2018 08:23 )             43.9       10-16    140  |  108  |  7   ----------------------------<  187<H>  4.4   |  25  |  0.72    Ca    8.6      16 Oct 2018 08:23  Phos  3.0     10-15  Mg     1.7     10-15    TPro  7.3  /  Alb  3.2<L>  /  TBili  0.3  /  DBili  x   /  AST  23  /  ALT  29  /  AlkPhos  154<H>  10-14      Hemoglobin A1C, Whole Blood (10.15.18 @ 10:22)    Hemoglobin A1C, Whole Blood: 15.0: Method: Immunoassay       Reference Range                4.0-5.6%       High risk (prediabetic)        5.7-6.4%       Diabetic, diagnostic             >=6.5%       ADA diabetic treatment goal       <7.0%  The Hemoglobin A1c testing is NGSP-certified.Reference ranges are based  upon the 2010 recommendations of  the American Diabetes Association.  Interpretation may vary for children  and adolescents. %          .Urine Clean Catch (Midstream)  10-14 @ 21:59   No growth  --  --          Radiology Results      Meds    MEDICATIONS  (STANDING):  ATENolol  Tablet 50 milliGRAM(s) Oral daily  dextrose 5%. 1000 milliLiter(s) (50 mL/Hr) IV Continuous <Continuous>  dextrose 50% Injectable 12.5 Gram(s) IV Push once  dextrose 50% Injectable 25 Gram(s) IV Push once  dextrose 50% Injectable 25 Gram(s) IV Push once  enoxaparin Injectable 40 milliGRAM(s) SubCutaneous daily  insulin glargine Injectable (LANTUS) 5 Unit(s) SubCutaneous at bedtime  insulin lispro (HumaLOG) corrective regimen sliding scale   SubCutaneous three times a day before meals  insulin lispro (HumaLOG) corrective regimen sliding scale   SubCutaneous at bedtime  simvastatin 20 milliGRAM(s) Oral at bedtime      MEDICATIONS  (PRN):  dextrose 40% Gel 15 Gram(s) Oral once PRN Blood Glucose LESS THAN 70 milliGRAM(s)/deciLiter  glucagon  Injectable 1 milliGRAM(s) IntraMuscular once PRN Glucose <70 milliGRAM(s)/deciLiter      Physical Exam    Neuro :  no focal deficits  Respiratory: CTA B/L  CV: RRR, S1S2, no murmurs,   Abdominal: Soft, NT, ND +BS,  Extremities: No edema, + peripheral pulses    ASSESSMENT    uncontrolled dm  h/o DM type 2 (diabetes mellitus, type 2)  No significant past surgical history      PLAN      endo f/u  cont lantus and humalog  Hba1C, 15 noted above  serum glucose improving  cont current meds Patient is a 83y old  Female who presents with a chief complaint of Hyperglycemia (15 Oct 2018 19:29)    pt seen in ed [  ], reg med floor [  x ], bed [ x ], chair at bedside [   ], a+o x3 [ x ], lethargic [  ],  nad [ x ]      Allergies    No Known Allergies        Vitals    T(F): 97.6 (10-16-18 @ 05:37), Max: 98.6 (10-15-18 @ 15:50)  HR: 78 (10-16-18 @ 05:37) (66 - 78)  BP: 146/70 (10-16-18 @ 05:37) (102/49 - 148/78)  RR: 18 (10-16-18 @ 05:37) (16 - 18)  SpO2: 100% (10-16-18 @ 05:37) (97% - 100%)  Wt(kg): --  CAPILLARY BLOOD GLUCOSE      POCT Blood Glucose.: 181 mg/dL (16 Oct 2018 08:11)      Labs                          14.6   8.1   )-----------( 191      ( 16 Oct 2018 08:23 )             43.9       10-16    140  |  108  |  7   ----------------------------<  187<H>  4.4   |  25  |  0.72    Ca    8.6      16 Oct 2018 08:23  Phos  3.0     10-15  Mg     1.7     10-15    TPro  7.3  /  Alb  3.2<L>  /  TBili  0.3  /  DBili  x   /  AST  23  /  ALT  29  /  AlkPhos  154<H>  10-14      Hemoglobin A1C, Whole Blood (10.15.18 @ 10:22)    Hemoglobin A1C, Whole Blood: 15.0: Method: Immunoassay       Reference Range                4.0-5.6%       High risk (prediabetic)        5.7-6.4%       Diabetic, diagnostic             >=6.5%       ADA diabetic treatment goal       <7.0%  The Hemoglobin A1c testing is NGSP-certified.Reference ranges are based  upon the 2010 recommendations of  the American Diabetes Association.  Interpretation may vary for children  and adolescents. %          .Urine Clean Catch (Midstream)  10-14 @ 21:59   No growth  --  --          Radiology Results      Meds    MEDICATIONS  (STANDING):  ATENolol  Tablet 50 milliGRAM(s) Oral daily  dextrose 5%. 1000 milliLiter(s) (50 mL/Hr) IV Continuous <Continuous>  dextrose 50% Injectable 12.5 Gram(s) IV Push once  dextrose 50% Injectable 25 Gram(s) IV Push once  dextrose 50% Injectable 25 Gram(s) IV Push once  enoxaparin Injectable 40 milliGRAM(s) SubCutaneous daily  insulin glargine Injectable (LANTUS) 5 Unit(s) SubCutaneous at bedtime  insulin lispro (HumaLOG) corrective regimen sliding scale   SubCutaneous three times a day before meals  insulin lispro (HumaLOG) corrective regimen sliding scale   SubCutaneous at bedtime  simvastatin 20 milliGRAM(s) Oral at bedtime      MEDICATIONS  (PRN):  dextrose 40% Gel 15 Gram(s) Oral once PRN Blood Glucose LESS THAN 70 milliGRAM(s)/deciLiter  glucagon  Injectable 1 milliGRAM(s) IntraMuscular once PRN Glucose <70 milliGRAM(s)/deciLiter      Physical Exam    Neuro :  no focal deficits  Respiratory: CTA B/L  CV: RRR, S1S2, no murmurs,   Abdominal: Soft, NT, ND +BS,  Extremities: No edema, + peripheral pulses    ASSESSMENT    uncontrolled dm  h/o DM type 2 (diabetes mellitus, type 2)  No significant past surgical history      PLAN      endo f/u  cont lantus and humalog  Hba1C, 15 noted above  serum glucose improving  phys tx eval  cont current meds

## 2018-10-16 NOTE — PATIENT PROFILE ADULT - HAS THE PATIENT BEEN ADMITTED FROM SHORT TERM REHAB?
Assessment and Plan





Chest apin, ruled out ACS


DM type 2 with hyperglycemia


HTN, uncontrolled


PVD s/p left leg BKA


h/o DVT/PE on xarelto


hypokalemia, will replete





Plan


- stress test negative today


- preserved EF on 2d echo, 


- Monitor BG qACHS, placed on insulin


- will further adjust insulin dose for better BG control


- will adjust BP meds


- resumed xarelto 

















Subjective


Date of service: 08/10/18


Interval history: 





Pt seen and examined


stress test was normal today


BP remained elevated


 





Objective





- Constitutional


Vitals: 


 Vital Signs - 12hr











  08/10/18 08/10/18 08/10/18





  04:41 07:43 09:00


 


Temperature 98.3 F 98.4 F 


 


Pulse Rate 84 87 80


 


Respiratory 18 18 





Rate   


 


Blood Pressure 136/84 149/86 163/98


 


Blood Pressure   





[Left]   


 


O2 Sat by Pulse 97 98 





Oximetry   














  08/10/18 08/10/18 08/10/18





  09:17 09:49 09:50


 


Temperature 98.4 F  


 


Pulse Rate 82 121 H 111 H


 


Respiratory 18  





Rate   


 


Blood Pressure  175/106 193/103


 


Blood Pressure 149/86  





[Left]   


 


O2 Sat by Pulse 97  





Oximetry   














  08/10/18 08/10/18 08/10/18





  09:51 09:52 09:53


 


Temperature   


 


Pulse Rate 100 H 98 H 98 H


 


Respiratory   





Rate   


 


Blood Pressure 184/97 161/92 166/90


 


Blood Pressure   





[Left]   


 


O2 Sat by Pulse   





Oximetry   














  08/10/18 08/10/18





  09:54 13:10


 


Temperature  97.8 F


 


Pulse Rate 96 H 95 H


 


Respiratory  98 H





Rate  


 


Blood Pressure 146/88 


 


Blood Pressure  164/115





[Left]  


 


O2 Sat by Pulse  98





Oximetry  











General appearance: Present: no acute distress, obese





- EENT


Eyes: PERRL, EOM intact


ENT: hearing intact, clear oral mucosa


Ears: bilateral: normal





- Neck


Neck: supple, normal ROM





- Respiratory


Respiratory effort: normal


Respiratory: bilateral: CTA





- Cardiovascular


Rhythm: regular


Heart Sounds: Present: S1 & S2.  Absent: gallop, rub


Extremities: pulses intact, No edema, normal color, Full ROM





- Gastrointestinal


General gastrointestinal: Present: soft, non-tender, non-distended, normal 

bowel sounds





- Integumentary


Integumentary: clear, warm, dry





- Musculoskeletal


Musculoskeletal: 1, strength equal bilaterally





- Neurologic


Neurologic: moves all extremities





- Psychiatric


Psychiatric: memory intact, appropriate mood/affect, intact judgment & insight





- Labs


CBC & Chem 7: 


 08/09/18 11:55





 08/10/18 07:14


Labs: 


 Abnormal lab results











  08/09/18 08/09/18 08/09/18 Range/Units





  15:32 16:07 22:13 


 


Potassium     (3.6-5.0)  mmol/L


 


Creatinine     (0.7-1.2)  mg/dL


 


Glucose     ()  mg/dL


 


POC Glucose  320 H  353 H  232 H  ()  














  08/10/18 08/10/18 08/10/18 Range/Units





  06:48 07:14 11:58 


 


Potassium   3.5 L   (3.6-5.0)  mmol/L


 


Creatinine   0.4 L   (0.7-1.2)  mg/dL


 


Glucose   190 H   ()  mg/dL


 


POC Glucose  184 H   275 H  () no

## 2018-10-16 NOTE — PROGRESS NOTE ADULT - PROBLEM SELECTOR PLAN 1
-A1C: 15  - most likely because of poor diet compliance-   Blood glucose well controlled with Lantus 5 units  -Endo follow up   Diabetic teaching  Humalog before meals and sliding scale covering order in place but patient is not requiring it

## 2018-10-16 NOTE — PROGRESS NOTE ADULT - SUBJECTIVE AND OBJECTIVE BOX
HPI:  Pt is an 84 y/o F, from home, with a PMHx of DM type II (oral medication no insulin) who presents to the ED with complaints of elevated blood glucose at home.  Pt is a poor historian and history obtained over the phone from daughter, Chantal, who notes blood glucose was elevated yesterday in the 300's.  Pt continued to take her PO glipized as scheduled.  This AM her glucose was 340 before breakfast.  Pt also complained of urinary urgency and dysuria.  Daughter also notes pt has been drinking a lot of water lately.  Pt denies HA, blurred vision, diaphoresis, weakness, loss of apetite, lethargy/ confusion, or dizziness.  Pt last saw her PCP, Dr. Zarate, 1 mo prior and blood sugars were fine at that time.  Pt was switched from metformin to Glipizide 6mo prior, and sugars have been well controlled.  Pt is compliant with medication.  Pt's diet is poor.      In the ED, pt presents in no acute distress, and vitals WNL (14 Oct 2018 21:02)      Meds:    Allergies:  Allergies    No Known Allergies    Intolerances        REVIEW OF SYSTEMS: taya diet  no vomiting  no abd pain    CONSTITUTIONAL: No weakness, fevers or chills  EYES/ENT: No visual changes;  No vertigo or throat pain   NECK: No pain or stiffness  RESPIRATORY: No cough, wheezing, hemoptysis; No shortness of breath  CARDIOVASCULAR: No chest pain or palpitations  GASTROINTESTINAL: No abdominal or epigastric pain. No nausea, vomiting, or hematemesis; No diarrhea or constipation. No melena or hematochezia.  GENITOURINARY: No dysuria, frequency or hematuria  NEUROLOGICAL: No numbness or weakness  SKIN: No itching, burning, rashes, or lesions   All other review of systems is negative unless indicated above.    PHYSICAL EXAM:    Vital Signs Last 24 Hrs  T(C): 37 (16 Oct 2018 14:17), Max: 37 (16 Oct 2018 14:17)  T(F): 98.6 (16 Oct 2018 14:17), Max: 98.6 (16 Oct 2018 14:17)  HR: 76 (16 Oct 2018 14:17) (73 - 78)  BP: 147/67 (16 Oct 2018 14:17) (134/72 - 148/78)  BP(mean): --  RR: 17 (16 Oct 2018 14:17) (16 - 18)  SpO2: 96% (16 Oct 2018 14:17) (96% - 100%)    HEENT: elderly female  legally blind  awake alert  lungs ae fair  cardia reg  abd soft    Neck:  [  ] Supple  [  ] Lymphadenopathy  [  ] JVD  [  ] Masses  [  ] WNL    CHEST/Respiratory: [ ] Clear to auscultation     [  ] Rales      [  ] Rhonchi      [  ] Wheezing       [  ] Chest Tenderness     [  ] WNL    Cardiovascular:  [  ]S1 S2  [  ] Reg  [  ] Irreg   [  ] No Murmurs   [  ] Murmurs  [  ] Systolic [  ] Diastolic    Gastrointestinal:  [  ] Bowel Sounds  [   ] ABD Soft  [  ] ABD Distention   [  ] No Tenderness [  ] Tenderness  [  ] Organomegaly  [  ] Guarding rigidity  [  ] No Guarding rigidity  [  ] Rebound Tenderness [  ] No Rebound Tenderness    Extremities: [  ] Edema  [  ] No Edema  [  ] Clubbing   [  ] Cyanosis  [  ] Palpable peripheral pulses                          [  ] Tender calf muscles    [  ] No Tender Calf Muscles    Neurological:  [  ] Alert  [  ] Awake  [  ] Oriented  x                              [  ] Focal weakness  [  ] No Focal Weakness    Skin:  [  ] Thrombophlebitis  [  ] Rashes  [  ] Dry  [  ] Ulcers    Ortho:  [  ] Joint Swelling  [  ] Joint erythema [  ] DJD [  ] Increased Temperature to Touch      LABS/DIAGNOSTIC TESTS                          14.6   8.1   )-----------( 191      ( 16 Oct 2018 08:23 )             43.9         10-16    140  |  108  |  7   ----------------------------<  187<H>  4.4   |  25  |  0.72    Ca    8.6      16 Oct 2018 08:23  Phos  3.0     10-15  Mg     1.7     10-15    TPro  7.3  /  Alb  3.2<L>  /  TBili  0.3  /  DBili  x   /  AST  23  /  ALT  29  /  AlkPhos  154<H>  10-14      CAPILLARY BLOOD GLUCOSE      POCT Blood Glucose.: 292 mg/dL (16 Oct 2018 14:22)  POCT Blood Glucose.: 252 mg/dL (16 Oct 2018 13:15)  POCT Blood Glucose.: 181 mg/dL (16 Oct 2018 08:11)  POCT Blood Glucose.: 245 mg/dL (16 Oct 2018 00:12)  POCT Blood Glucose.: 164 mg/dL (15 Oct 2018 16:56)      LIVER FUNCTIONS - ( 14 Oct 2018 19:11 )  Alb: 3.2 g/dL / Pro: 7.3 g/dL / ALK PHOS: 154 U/L / ALT: 29 U/L DA / AST: 23 U/L / GGT: x           Hemoglobin A1C, Whole Blood: 15.0 % (10-15 @ 10:22)    Thyroid Stimulating Hormone, Serum: 0.94 uU/mL (10-15 @ 06:47)    CULTURES:       RADIOLOGY  CXR:    ATENolol  Tablet 50 milliGRAM(s) Oral daily  dextrose 40% Gel 15 Gram(s) Oral once PRN  dextrose 5%. 1000 milliLiter(s) IV Continuous <Continuous>  dextrose 50% Injectable 12.5 Gram(s) IV Push once  dextrose 50% Injectable 25 Gram(s) IV Push once  dextrose 50% Injectable 25 Gram(s) IV Push once  enoxaparin Injectable 40 milliGRAM(s) SubCutaneous daily  glucagon  Injectable 1 milliGRAM(s) IntraMuscular once PRN  insulin glargine Injectable (LANTUS) 5 Unit(s) SubCutaneous at bedtime  insulin lispro (HumaLOG) corrective regimen sliding scale   SubCutaneous three times a day before meals  insulin lispro (HumaLOG) corrective regimen sliding scale   SubCutaneous at bedtime  simvastatin 20 milliGRAM(s) Oral at bedtime

## 2018-10-16 NOTE — PROGRESS NOTE ADULT - SUBJECTIVE AND OBJECTIVE BOX
PGY 1 Note discussed with supervising resident and primary attending    Patient is a 83y old  Female who presents with a chief complaint of Hyperglycemia (16 Oct 2018 10:27)      INTERVAL HPI/OVERNIGHT EVENTS:   Patient seen at the bedside. No new complains no over night events'    MEDICATIONS  (STANDING):  ATENolol  Tablet 50 milliGRAM(s) Oral daily  dextrose 5%. 1000 milliLiter(s) (50 mL/Hr) IV Continuous <Continuous>  dextrose 50% Injectable 12.5 Gram(s) IV Push once  dextrose 50% Injectable 25 Gram(s) IV Push once  dextrose 50% Injectable 25 Gram(s) IV Push once  enoxaparin Injectable 40 milliGRAM(s) SubCutaneous daily  insulin glargine Injectable (LANTUS) 5 Unit(s) SubCutaneous at bedtime  insulin lispro (HumaLOG) corrective regimen sliding scale   SubCutaneous three times a day before meals  insulin lispro (HumaLOG) corrective regimen sliding scale   SubCutaneous at bedtime  simvastatin 20 milliGRAM(s) Oral at bedtime    MEDICATIONS  (PRN):  dextrose 40% Gel 15 Gram(s) Oral once PRN Blood Glucose LESS THAN 70 milliGRAM(s)/deciLiter  glucagon  Injectable 1 milliGRAM(s) IntraMuscular once PRN Glucose <70 milliGRAM(s)/deciLiter      __________________________________________________  REVIEW OF SYSTEMS:    CONSTITUTIONAL: No fever,   EYES: no acute visual disturbances  NECK: No pain or stiffness  RESPIRATORY: No cough; No shortness of breath  CARDIOVASCULAR: No chest pain, no palpitations  GASTROINTESTINAL: No pain. No nausea or vomiting; No diarrhea   NEUROLOGICAL: No headache or numbness, no tremors  MUSCULOSKELETAL: No joint pain, no muscle pain  GENITOURINARY: no dysuria, no frequency, no hesitancy  PSYCHIATRY: no depression , no anxiety  ALL OTHER  ROS negative        Vital Signs Last 24 Hrs  T(C): 37 (16 Oct 2018 14:17), Max: 37 (16 Oct 2018 14:17)  T(F): 98.6 (16 Oct 2018 14:17), Max: 98.6 (16 Oct 2018 14:17)  HR: 76 (16 Oct 2018 14:17) (73 - 78)  BP: 147/67 (16 Oct 2018 14:17) (134/72 - 148/78)  BP(mean): --  RR: 17 (16 Oct 2018 14:17) (16 - 18)  SpO2: 96% (16 Oct 2018 14:17) (96% - 100%)    ________________________________________________  PHYSICAL EXAM:  GENERAL: NAD, blind  HEENT: Normocephalic;  conjunctivae and sclerae clear; moist mucous membranes;   NECK : supple  CHEST/LUNG: Clear to auscultation bilaterally with good air entry   HEART: S1 S2  regular; no murmurs, gallops or rubs  ABDOMEN: Soft, Nontender, Nondistended; Bowel sounds present  EXTREMITIES: no cyanosis; no edema; no calf tenderness  SKIN: warm and dry; no rash  NERVOUS SYSTEM:  Awake and alert; Oriented  to place, person and time ; no new deficits    _________________________________________________  LABS:                        14.6   8.1   )-----------( 191      ( 16 Oct 2018 08:23 )             43.9     10-16    140  |  108  |  7   ----------------------------<  187<H>  4.4   |  25  |  0.72    Ca    8.6      16 Oct 2018 08:23  Phos  3.0     10-15  Mg     1.7     10-15    TPro  7.3  /  Alb  3.2<L>  /  TBili  0.3  /  DBili  x   /  AST  23  /  ALT  29  /  AlkPhos  154<H>  10-14      Urinalysis Basic - ( 14 Oct 2018 20:12 )    Color: Yellow / Appearance: Clear / S.010 / pH: x  Gluc: x / Ketone: Negative  / Bili: Negative / Urobili: Negative   Blood: x / Protein: Negative / Nitrite: Negative   Leuk Esterase: Negative / RBC: 0-2 /HPF / WBC 0-2 /HPF   Sq Epi: x / Non Sq Epi: Few /HPF / Bacteria: Trace /HPF      CAPILLARY BLOOD GLUCOSE      POCT Blood Glucose.: 292 mg/dL (16 Oct 2018 14:22)  POCT Blood Glucose.: 252 mg/dL (16 Oct 2018 13:15)  POCT Blood Glucose.: 181 mg/dL (16 Oct 2018 08:11)  POCT Blood Glucose.: 245 mg/dL (16 Oct 2018 00:12)  POCT Blood Glucose.: 164 mg/dL (15 Oct 2018 16:56)        RADIOLOGY & ADDITIONAL TESTS:    Imaging Personally Reviewed:  YES/NO    Consultant(s) Notes Reviewed:   YES/ No    Care Discussed with Consultants :     Plan of care was discussed with patient and /or primary care giver; all questions and concerns were addressed and care was aligned with patient's wishes.

## 2018-10-17 LAB
GLUCOSE BLDC GLUCOMTR-MCNC: 217 MG/DL — HIGH (ref 70–99)
GLUCOSE BLDC GLUCOMTR-MCNC: 219 MG/DL — HIGH (ref 70–99)
GLUCOSE BLDC GLUCOMTR-MCNC: 259 MG/DL — HIGH (ref 70–99)
GLUCOSE BLDC GLUCOMTR-MCNC: 270 MG/DL — HIGH (ref 70–99)
HCT VFR BLD CALC: 43 % — SIGNIFICANT CHANGE UP (ref 34.5–45)
HGB BLD-MCNC: 14.4 G/DL — SIGNIFICANT CHANGE UP (ref 11.5–15.5)
MCHC RBC-ENTMCNC: 30.5 PG — SIGNIFICANT CHANGE UP (ref 27–34)
MCHC RBC-ENTMCNC: 33.5 GM/DL — SIGNIFICANT CHANGE UP (ref 32–36)
MCV RBC AUTO: 91.1 FL — SIGNIFICANT CHANGE UP (ref 80–100)
PLATELET # BLD AUTO: 185 K/UL — SIGNIFICANT CHANGE UP (ref 150–400)
RBC # BLD: 4.72 M/UL — SIGNIFICANT CHANGE UP (ref 3.8–5.2)
RBC # FLD: 12 % — SIGNIFICANT CHANGE UP (ref 10.3–14.5)
WBC # BLD: 7.2 K/UL — SIGNIFICANT CHANGE UP (ref 3.8–10.5)
WBC # FLD AUTO: 7.2 K/UL — SIGNIFICANT CHANGE UP (ref 3.8–10.5)

## 2018-10-17 RX ORDER — PREGABALIN 225 MG/1
1000 CAPSULE ORAL DAILY
Qty: 0 | Refills: 0 | Status: COMPLETED | OUTPATIENT
Start: 2018-10-17 | End: 2018-10-23

## 2018-10-17 RX ADMIN — Medication 2: at 12:06

## 2018-10-17 RX ADMIN — Medication 3: at 17:13

## 2018-10-17 RX ADMIN — SIMVASTATIN 20 MILLIGRAM(S): 20 TABLET, FILM COATED ORAL at 22:30

## 2018-10-17 RX ADMIN — ATENOLOL 50 MILLIGRAM(S): 25 TABLET ORAL at 06:05

## 2018-10-17 RX ADMIN — Medication 2: at 07:57

## 2018-10-17 RX ADMIN — Medication 1: at 22:30

## 2018-10-17 RX ADMIN — PREGABALIN 1000 MICROGRAM(S): 225 CAPSULE ORAL at 11:38

## 2018-10-17 RX ADMIN — ENOXAPARIN SODIUM 40 MILLIGRAM(S): 100 INJECTION SUBCUTANEOUS at 11:39

## 2018-10-17 RX ADMIN — INSULIN GLARGINE 12 UNIT(S): 100 INJECTION, SOLUTION SUBCUTANEOUS at 22:30

## 2018-10-17 NOTE — PROGRESS NOTE ADULT - PROBLEM SELECTOR PLAN 1
-A1C: 15  - most likely because of poor diet compliance-   Lantus: 12, Humalog 3 TID before meals  Finger sticks  -Endo follow up   Diabetic teaching

## 2018-10-17 NOTE — PHYSICAL THERAPY INITIAL EVALUATION ADULT - PRECAUTIONS/LIMITATIONS, REHAB EVAL
fall precautions/vision precautions/no known precautions/limitations fall precautions/vision precautions

## 2018-10-17 NOTE — PHYSICAL THERAPY INITIAL EVALUATION ADULT - LEVEL OF INDEPENDENCE: GAIT, REHAB EVAL
supervision/close supervision due to impaired vision primarily secondary to vision impairment and in unfamiliar setting/contact guard

## 2018-10-17 NOTE — PHYSICAL THERAPY INITIAL EVALUATION ADULT - CRITERIA FOR SKILLED THERAPEUTIC INTERVENTIONS
functional limitations in following categories/impairments found/therapy frequency/risk reduction/prevention/predicted duration of therapy intervention/rehab potential

## 2018-10-17 NOTE — DIETITIAN INITIAL EVALUATION ADULT. - WEIGHT IN KG
Fever in Children 4 Years and Older: Care Instructions  Your Care Instructions    A fever is a high body temperature. Fever is the body's normal reaction to infection and other illnesses, both minor and serious. Fevers help the body fight infection. In most cases, fever means your child has a minor illness. Often you must look at your child's other symptoms to determine how serious the illness is. Children with a fever often have an infection caused by a virus, such as a cold or the flu. Infections caused by bacteria, such as strep throat or an ear infection, also can cause a fever. Follow-up care is a key part of your child's treatment and safety. Be sure to make and go to all appointments, and call your doctor if your child is having problems. It's also a good idea to know your child's test results and keep a list of the medicines your child takes. How can you care for your child at home? · Don't use temperature alone to  how sick your child is. Instead, look at how your child acts. Care at home is often all that is needed if your child is:  ¨ Comfortable and alert. ¨ Eating well. ¨ Drinking enough fluid. ¨ Urinating as usual.  ¨ Starting to feel better. · Give your child extra fluids or flavored ice pops to suck on. This will help prevent dehydration. · Dress your child in light clothes or pajamas. Don't wrap your child in blankets. · If your child has a fever and is uncomfortable, give an over-the-counter medicine such as acetaminophen (Tylenol) or ibuprofen (Advil, Motrin). Be safe with medicines. Read and follow all instructions on the label. Do not give aspirin to anyone younger than 20. It has been linked to Reye syndrome, a serious illness. · Be careful when giving your child over-the-counter cold or flu medicines and Tylenol at the same time. Many of these medicines have acetaminophen, which is Tylenol.  Read the labels to make sure that you are not giving your child more than the recommended dose. Too much acetaminophen (Tylenol) can be harmful. When should you call for help? Call 911 anytime you think your child may need emergency care. For example, call if:  · Your child seems very sick or is hard to wake up. Call your doctor now or seek immediate medical care if:  · Your child seems to be getting sicker. · The fever gets much higher. · There are new or worse symptoms along with the fever. These may include a cough, a rash, or ear pain. Watch closely for changes in your child's health, and be sure to contact your doctor if:  · The fever hasn't gone down after 48 hours. · Your child does not get better as expected. Where can you learn more? Go to http://mona-jcarlos.info/. Enter O314 in the search box to learn more about \"Fever in Children 4 Years and Older: Care Instructions. \"  Current as of: May 27, 2016  Content Version: 11.1  © 3049-1295 [employer]. Care instructions adapted under license by [employer] (which disclaims liability or warranty for this information). If you have questions about a medical condition or this instruction, always ask your healthcare professional. [place] [address] any warranty or liability for your use of this information. Upper Respiratory Infection (Cold) in Children: Care Instructions  Your Care Instructions    An upper respiratory infection, also called a URI, is an infection of the nose, sinuses, or throat. URIs are spread by coughs, sneezes, and direct contact. The common cold is the most frequent kind of URI. The flu and sinus infections are other kinds of URIs. Almost all URIs are caused by viruses, so antibiotics won't cure them. But you can do things at home to help your child get better. With most URIs, your child should feel better in 4 to 10 days. The doctor has checked your child carefully, but problems can develop later.  If you notice any problems or new symptoms, get medical treatment right away. Follow-up care is a key part of your child's treatment and safety. Be sure to make and go to all appointments, and call your doctor if your child is having problems. It's also a good idea to know your child's test results and keep a list of the medicines your child takes. How can you care for your child at home? · Give your child acetaminophen (Tylenol) or ibuprofen (Advil, Motrin) for fever, pain, or fussiness. Read and follow all instructions on the label. Do not give aspirin to anyone younger than 20. It has been linked to Reye syndrome, a serious illness. Do not give ibuprofen to a child who is younger than 6 months. · Be careful with cough and cold medicines. Don't give them to children younger than 6, because they don't work for children that age and can even be harmful. For children 6 and older, always follow all the instructions carefully. Make sure you know how much medicine to give and how long to use it. And use the dosing device if one is included. · Be careful when giving your child over-the-counter cold or flu medicines and Tylenol at the same time. Many of these medicines have acetaminophen, which is Tylenol. Read the labels to make sure that you are not giving your child more than the recommended dose. Too much acetaminophen (Tylenol) can be harmful. · Make sure your child rests. Keep your child at home if he or she has a fever. · If your child has problems breathing because of a stuffy nose, squirt a few saline (saltwater) nasal drops in one nostril. Then have your child blow his or her nose. Repeat for the other nostril. Do not do this more than 5 or 6 times a day. · Place a humidifier by your child's bed or close to your child. This may make it easier for your child to breathe. Follow the directions for cleaning the machine. · Keep your child away from smoke. Do not smoke or let anyone else smoke around your child or in your house.   · Wash your hands and your child's hands regularly so that you don't spread the disease. When should you call for help? Call 911 anytime you think your child may need emergency care. For example, call if:  · Your child seems very sick or is hard to wake up. · Your child has severe trouble breathing. Symptoms may include:  ¨ Using the belly muscles to breathe. ¨ The chest sinking in or the nostrils flaring when your child struggles to breathe. Call your doctor now or seek immediate medical care if:  · Your child has new or worse trouble breathing. · Your child has a new or higher fever. · Your child seems to be getting much sicker. · Your child coughs up dark brown or bloody mucus (sputum). Watch closely for changes in your child's health, and be sure to contact your doctor if:  · Your child has new symptoms, such as a rash, earache, or sore throat. · Your child does not get better as expected. Where can you learn more? Go to http://mona-jcarlos.info/. Enter M207 in the search box to learn more about \"Upper Respiratory Infection (Cold) in Children: Care Instructions. \"  Current as of: June 30, 2016  Content Version: 11.1  © 4901-9308 [employer]. Care instructions adapted under license by [employer] (which disclaims liability or warranty for this information). If you have questions about a medical condition or this instruction, always ask your healthcare professional. [place] [address] any warranty or liability for your use of this information. 49.8

## 2018-10-17 NOTE — DIETITIAN INITIAL EVALUATION ADULT. - PERTINENT LABORATORY DATA
10-16 Na140 mmol/L Glu 187 mg/dL<H> K+ 4.4 mmol/L Cr  0.72 mg/dL BUN 7 mg/dL 10-15 Phos 3.0 mg/dL 10-14 Alb 3.2 g/dL<L> 10-15 [id number] 15.0 %<H> 10-15 Chol 178 mg/dL  mg/dL HDL 36 mg/dL<L> Trig 138 mg/dL

## 2018-10-17 NOTE — PROGRESS NOTE ADULT - SUBJECTIVE AND OBJECTIVE BOX
PGY 1 Note discussed with supervising resident and primary attending    Patient is a 83y old  Female who presents with a chief complaint of Hyperglycemia (17 Oct 2018 12:06)      INTERVAL HPI/OVERNIGHT EVENTS:   Patient seen at the bedside. No new complains      MEDICATIONS  (STANDING):  ATENolol  Tablet 50 milliGRAM(s) Oral daily  cyanocobalamin Injectable 1000 MICROGram(s) IntraMuscular daily  dextrose 5%. 1000 milliLiter(s) (50 mL/Hr) IV Continuous <Continuous>  dextrose 50% Injectable 12.5 Gram(s) IV Push once  dextrose 50% Injectable 25 Gram(s) IV Push once  dextrose 50% Injectable 25 Gram(s) IV Push once  enoxaparin Injectable 40 milliGRAM(s) SubCutaneous daily  insulin glargine Injectable (LANTUS) 12 Unit(s) SubCutaneous at bedtime  insulin lispro (HumaLOG) corrective regimen sliding scale   SubCutaneous three times a day before meals  insulin lispro (HumaLOG) corrective regimen sliding scale   SubCutaneous at bedtime  simvastatin 20 milliGRAM(s) Oral at bedtime    MEDICATIONS  (PRN):  dextrose 40% Gel 15 Gram(s) Oral once PRN Blood Glucose LESS THAN 70 milliGRAM(s)/deciLiter  glucagon  Injectable 1 milliGRAM(s) IntraMuscular once PRN Glucose <70 milliGRAM(s)/deciLiter      __________________________________________________  REVIEW OF SYSTEMS:    CONSTITUTIONAL: No fever,   EYES: no acute visual disturbances  NECK: No pain or stiffness  RESPIRATORY: No cough; No shortness of breath  CARDIOVASCULAR: No chest pain, no palpitations  GASTROINTESTINAL: No pain. No nausea or vomiting; No diarrhea   NEUROLOGICAL: No headache or numbness, no tremors  MUSCULOSKELETAL: No joint pain, no muscle pain  GENITOURINARY: no dysuria, no frequency, no hesitancy  PSYCHIATRY: no depression , no anxiety  ALL OTHER  ROS negative        Vital Signs Last 24 Hrs  T(C): 36.2 (17 Oct 2018 14:58), Max: 37.2 (16 Oct 2018 21:08)  T(F): 97.1 (17 Oct 2018 14:58), Max: 98.9 (16 Oct 2018 21:08)  HR: 72 (17 Oct 2018 14:58) (69 - 74)  BP: 108/59 (17 Oct 2018 14:58) (96/60 - 141/65)  BP(mean): --  RR: 17 (17 Oct 2018 14:58) (17 - 18)  SpO2: 99% (17 Oct 2018 14:58) (94% - 99%)    ________________________________________________  PHYSICAL EXAM:  GENERAL: NAD, blind   HEENT: Normocephalic;  conjunctivae and sclerae clear; moist mucous membranes;   NECK : supple  CHEST/LUNG: Clear to auscultation bilaterally with good air entry   HEART: S1 S2  regular; no murmurs, gallops or rubs  ABDOMEN: Soft, Nontender, Nondistended; Bowel sounds present  EXTREMITIES: no cyanosis; no edema; no calf tenderness  SKIN: warm and dry; no rash  NERVOUS SYSTEM:  Awake and alert; Oriented  to place, person and time ; no new deficits    _________________________________________________  LABS:                        14.4   7.2   )-----------( 185      ( 17 Oct 2018 06:46 )             43.0     10-16    140  |  108  |  7   ----------------------------<  187<H>  4.4   |  25  |  0.72    Ca    8.6      16 Oct 2018 08:23          CAPILLARY BLOOD GLUCOSE      POCT Blood Glucose.: 219 mg/dL (17 Oct 2018 11:55)  POCT Blood Glucose.: 217 mg/dL (17 Oct 2018 07:53)  POCT Blood Glucose.: 181 mg/dL (16 Oct 2018 21:49)  POCT Blood Glucose.: 191 mg/dL (16 Oct 2018 17:02)        RADIOLOGY & ADDITIONAL TESTS:    Imaging Personally Reviewed:  YES    Consultant(s) Notes Reviewed:   YES/    Care Discussed with Consultants :     Plan of care was discussed with patient and /or primary care giver; all questions and concerns were addressed and care was aligned with patient's wishes.

## 2018-10-17 NOTE — DIETITIAN INITIAL EVALUATION ADULT. - OTHER INFO
Patient seen for A1C >7%. Patient from home lives with family Patient seen for A1C >7%. Patient from home lives with family. Visited pt. alert but confused, tried calling daughter no response, per PCA pt. appetite good, intake >50% & tolerating, nor reports of GI distress, unable to give diet education presently & RD to f/u with family/daughter, skin intact. Discussed with RN.

## 2018-10-17 NOTE — DIETITIAN INITIAL EVALUATION ADULT. - PROBLEM SELECTOR PLAN 1
-Hyperglycemic to 500's on admission  -s/p Humalog 10 units in ED  -Corrected A  -Monitor FS q 2 for now  -Na of 132 2/2 hypoglycemia--> corrected 136  -c/w Sliding Scale, will administer 5 lantus HS   -c/w IV hydration  -Diabetic Diet  -f/u HbA1c  -f/u AM BMP  -Endo: Dr. Baron

## 2018-10-17 NOTE — PROGRESS NOTE ADULT - SUBJECTIVE AND OBJECTIVE BOX
Patient is a 83y old  Female who presents with a chief complaint of Hyperglycemia (16 Oct 2018 16:31)    pt seen in ed [  ], reg med floor [  x ], bed [  ], chair at bedside [ x  ], a+o x3 [ x ], lethargic [  ],  nad [ x ]      Allergies    No Known Allergies        Vitals    T(F): 98.5 (10-17-18 @ 05:48), Max: 98.9 (10-16-18 @ 21:08)  HR: 74 (10-17-18 @ 09:16) (69 - 76)  BP: 96/60 (10-17-18 @ 09:16) (96/60 - 147/67)  RR: 18 (10-17-18 @ 05:48) (17 - 18)  SpO2: 96% (10-17-18 @ 09:16) (94% - 97%)  Wt(kg): --  CAPILLARY BLOOD GLUCOSE      POCT Blood Glucose.: 219 mg/dL (17 Oct 2018 11:55)      Labs                          14.4   7.2   )-----------( 185      ( 17 Oct 2018 06:46 )             43.0       10-16    140  |  108  |  7   ----------------------------<  187<H>  4.4   |  25  |  0.72    Ca    8.6      16 Oct 2018 08:23              .Urine Clean Catch (Midstream)  10-14 @ 21:59   No growth  --  --          Radiology Results      Meds    MEDICATIONS  (STANDING):  ATENolol  Tablet 50 milliGRAM(s) Oral daily  cyanocobalamin Injectable 1000 MICROGram(s) IntraMuscular daily  dextrose 5%. 1000 milliLiter(s) (50 mL/Hr) IV Continuous <Continuous>  dextrose 50% Injectable 12.5 Gram(s) IV Push once  dextrose 50% Injectable 25 Gram(s) IV Push once  dextrose 50% Injectable 25 Gram(s) IV Push once  enoxaparin Injectable 40 milliGRAM(s) SubCutaneous daily  insulin glargine Injectable (LANTUS) 12 Unit(s) SubCutaneous at bedtime  insulin lispro (HumaLOG) corrective regimen sliding scale   SubCutaneous three times a day before meals  insulin lispro (HumaLOG) corrective regimen sliding scale   SubCutaneous at bedtime  simvastatin 20 milliGRAM(s) Oral at bedtime      MEDICATIONS  (PRN):  dextrose 40% Gel 15 Gram(s) Oral once PRN Blood Glucose LESS THAN 70 milliGRAM(s)/deciLiter  glucagon  Injectable 1 milliGRAM(s) IntraMuscular once PRN Glucose <70 milliGRAM(s)/deciLiter      Physical Exam      Neuro :  no focal deficits  Respiratory: CTA B/L  CV: RRR, S1S2, no murmurs,   Abdominal: Soft, NT, ND +BS,  Extremities: No edema, + peripheral pulses    ASSESSMENT    uncontrolled dm  h/o DM type 2 (diabetes mellitus, type 2)  No significant past surgical history      PLAN      endo f/u noted  increase lantus  to 12units qhs and humalog 3 units premeals  Hba1C, 15 noted   serum glucose improving  phys tx eval noted and rec rolling walker and home phys tx  cont current meds  d/c plan for am if serum glucose stable

## 2018-10-18 LAB
ANION GAP SERPL CALC-SCNC: 9 MMOL/L — SIGNIFICANT CHANGE UP (ref 5–17)
BASOPHILS # BLD AUTO: 0 K/UL — SIGNIFICANT CHANGE UP (ref 0–0.2)
BASOPHILS NFR BLD AUTO: 0.6 % — SIGNIFICANT CHANGE UP (ref 0–2)
BUN SERPL-MCNC: 10 MG/DL — SIGNIFICANT CHANGE UP (ref 7–18)
CALCIUM SERPL-MCNC: 9.2 MG/DL — SIGNIFICANT CHANGE UP (ref 8.4–10.5)
CHLORIDE SERPL-SCNC: 107 MMOL/L — SIGNIFICANT CHANGE UP (ref 96–108)
CO2 SERPL-SCNC: 26 MMOL/L — SIGNIFICANT CHANGE UP (ref 22–31)
CREAT SERPL-MCNC: 0.84 MG/DL — SIGNIFICANT CHANGE UP (ref 0.5–1.3)
EOSINOPHIL # BLD AUTO: 0.4 K/UL — SIGNIFICANT CHANGE UP (ref 0–0.5)
EOSINOPHIL NFR BLD AUTO: 5.9 % — SIGNIFICANT CHANGE UP (ref 0–6)
GLUCOSE BLDC GLUCOMTR-MCNC: 174 MG/DL — HIGH (ref 70–99)
GLUCOSE BLDC GLUCOMTR-MCNC: 185 MG/DL — HIGH (ref 70–99)
GLUCOSE BLDC GLUCOMTR-MCNC: 229 MG/DL — HIGH (ref 70–99)
GLUCOSE BLDC GLUCOMTR-MCNC: 287 MG/DL — HIGH (ref 70–99)
GLUCOSE SERPL-MCNC: 198 MG/DL — HIGH (ref 70–99)
HCT VFR BLD CALC: 41.4 % — SIGNIFICANT CHANGE UP (ref 34.5–45)
HGB BLD-MCNC: 13.6 G/DL — SIGNIFICANT CHANGE UP (ref 11.5–15.5)
LYMPHOCYTES # BLD AUTO: 1.7 K/UL — SIGNIFICANT CHANGE UP (ref 1–3.3)
LYMPHOCYTES # BLD AUTO: 23.6 % — SIGNIFICANT CHANGE UP (ref 13–44)
MAGNESIUM SERPL-MCNC: 1.8 MG/DL — SIGNIFICANT CHANGE UP (ref 1.6–2.6)
MCHC RBC-ENTMCNC: 30.2 PG — SIGNIFICANT CHANGE UP (ref 27–34)
MCHC RBC-ENTMCNC: 32.8 GM/DL — SIGNIFICANT CHANGE UP (ref 32–36)
MCV RBC AUTO: 92 FL — SIGNIFICANT CHANGE UP (ref 80–100)
MONOCYTES # BLD AUTO: 0.7 K/UL — SIGNIFICANT CHANGE UP (ref 0–0.9)
MONOCYTES NFR BLD AUTO: 9.4 % — SIGNIFICANT CHANGE UP (ref 2–14)
NEUTROPHILS # BLD AUTO: 4.3 K/UL — SIGNIFICANT CHANGE UP (ref 1.8–7.4)
NEUTROPHILS NFR BLD AUTO: 60.6 % — SIGNIFICANT CHANGE UP (ref 43–77)
PHOSPHATE SERPL-MCNC: 3.4 MG/DL — SIGNIFICANT CHANGE UP (ref 2.5–4.5)
PLATELET # BLD AUTO: 196 K/UL — SIGNIFICANT CHANGE UP (ref 150–400)
POTASSIUM SERPL-MCNC: 4.4 MMOL/L — SIGNIFICANT CHANGE UP (ref 3.5–5.3)
POTASSIUM SERPL-SCNC: 4.4 MMOL/L — SIGNIFICANT CHANGE UP (ref 3.5–5.3)
RBC # BLD: 4.5 M/UL — SIGNIFICANT CHANGE UP (ref 3.8–5.2)
RBC # FLD: 12.2 % — SIGNIFICANT CHANGE UP (ref 10.3–14.5)
SODIUM SERPL-SCNC: 142 MMOL/L — SIGNIFICANT CHANGE UP (ref 135–145)
WBC # BLD: 7.1 K/UL — SIGNIFICANT CHANGE UP (ref 3.8–10.5)
WBC # FLD AUTO: 7.1 K/UL — SIGNIFICANT CHANGE UP (ref 3.8–10.5)

## 2018-10-18 RX ORDER — INSULIN GLARGINE 100 [IU]/ML
15 INJECTION, SOLUTION SUBCUTANEOUS AT BEDTIME
Qty: 0 | Refills: 0 | Status: DISCONTINUED | OUTPATIENT
Start: 2018-10-18 | End: 2018-10-19

## 2018-10-18 RX ORDER — INSULIN LISPRO 100/ML
3 VIAL (ML) SUBCUTANEOUS
Qty: 0 | Refills: 0 | Status: DISCONTINUED | OUTPATIENT
Start: 2018-10-18 | End: 2018-10-19

## 2018-10-18 RX ORDER — INSULIN GLARGINE 100 [IU]/ML
14 INJECTION, SOLUTION SUBCUTANEOUS AT BEDTIME
Qty: 0 | Refills: 0 | Status: DISCONTINUED | OUTPATIENT
Start: 2018-10-18 | End: 2018-10-18

## 2018-10-18 RX ADMIN — Medication 3 UNIT(S): at 12:36

## 2018-10-18 RX ADMIN — INSULIN GLARGINE 15 UNIT(S): 100 INJECTION, SOLUTION SUBCUTANEOUS at 22:03

## 2018-10-18 RX ADMIN — ATENOLOL 50 MILLIGRAM(S): 25 TABLET ORAL at 06:31

## 2018-10-18 RX ADMIN — Medication 1: at 08:32

## 2018-10-18 RX ADMIN — Medication 3 UNIT(S): at 17:19

## 2018-10-18 RX ADMIN — ENOXAPARIN SODIUM 40 MILLIGRAM(S): 100 INJECTION SUBCUTANEOUS at 12:38

## 2018-10-18 RX ADMIN — Medication 1: at 17:19

## 2018-10-18 RX ADMIN — Medication 3: at 12:37

## 2018-10-18 RX ADMIN — PREGABALIN 1000 MICROGRAM(S): 225 CAPSULE ORAL at 12:37

## 2018-10-18 RX ADMIN — SIMVASTATIN 20 MILLIGRAM(S): 20 TABLET, FILM COATED ORAL at 22:03

## 2018-10-18 NOTE — PROGRESS NOTE ADULT - SUBJECTIVE AND OBJECTIVE BOX
PGY 1 Note discussed with supervising resident and primary attending    Patient is a 83y old  Female who presents with a chief complaint of Hyperglycemia (18 Oct 2018 09:42)      INTERVAL HPI/OVERNIGHT EVENTS:     Patient seen at the bedside. no new complains    MEDICATIONS  (STANDING):  ATENolol  Tablet 50 milliGRAM(s) Oral daily  cyanocobalamin Injectable 1000 MICROGram(s) IntraMuscular daily  dextrose 5%. 1000 milliLiter(s) (50 mL/Hr) IV Continuous <Continuous>  dextrose 50% Injectable 12.5 Gram(s) IV Push once  dextrose 50% Injectable 25 Gram(s) IV Push once  dextrose 50% Injectable 25 Gram(s) IV Push once  enoxaparin Injectable 40 milliGRAM(s) SubCutaneous daily  insulin glargine Injectable (LANTUS) 15 Unit(s) SubCutaneous at bedtime  insulin lispro (HumaLOG) corrective regimen sliding scale   SubCutaneous three times a day before meals  insulin lispro Injectable (HumaLOG) 3 Unit(s) SubCutaneous three times a day before meals  simvastatin 20 milliGRAM(s) Oral at bedtime    MEDICATIONS  (PRN):  dextrose 40% Gel 15 Gram(s) Oral once PRN Blood Glucose LESS THAN 70 milliGRAM(s)/deciLiter  glucagon  Injectable 1 milliGRAM(s) IntraMuscular once PRN Glucose <70 milliGRAM(s)/deciLiter      __________________________________________________  REVIEW OF SYSTEMS:    CONSTITUTIONAL: No fever,   EYES: no acute visual disturbances  NECK: No pain or stiffness  RESPIRATORY: No cough; No shortness of breath  CARDIOVASCULAR: No chest pain, no palpitations  GASTROINTESTINAL: No pain. No nausea or vomiting; No diarrhea   NEUROLOGICAL: No headache or numbness, no tremors  MUSCULOSKELETAL: No joint pain, no muscle pain  GENITOURINARY: no dysuria, no frequency, no hesitancy  PSYCHIATRY: no depression , no anxiety  ALL OTHER  ROS negative        Vital Signs Last 24 Hrs  T(C): 37.5 (18 Oct 2018 05:41), Max: 37.5 (18 Oct 2018 05:41)  T(F): 99.5 (18 Oct 2018 05:41), Max: 99.5 (18 Oct 2018 05:41)  HR: 78 (18 Oct 2018 05:41) (67 - 78)  BP: 127/86 (18 Oct 2018 05:41) (108/59 - 127/86)  BP(mean): --  RR: 18 (18 Oct 2018 05:41) (17 - 18)  SpO2: 95% (18 Oct 2018 05:41) (95% - 99%)    ________________________________________________  PHYSICAL EXAM:  GENERAL: NAD, visual impairment  HEENT: Normocephalic;  conjunctivae and sclerae clear; moist mucous membranes;   NECK : supple  CHEST/LUNG: Clear to auscultation bilaterally with good air entry   HEART: S1 S2  regular; no murmurs, gallops or rubs  ABDOMEN: Soft, Nontender, Nondistended; Bowel sounds present  EXTREMITIES: no cyanosis; no edema; no calf tenderness  SKIN: warm and dry; no rash  NERVOUS SYSTEM:  Awake and alert; Oriented  to place, person and time ; no new deficits    _________________________________________________  LABS:                        13.6   7.1   )-----------( 196      ( 18 Oct 2018 07:22 )             41.4     10-18    142  |  107  |  10  ----------------------------<  198<H>  4.4   |  26  |  0.84    Ca    9.2      18 Oct 2018 07:22  Phos  3.4     10-18  Mg     1.8     10-18          CAPILLARY BLOOD GLUCOSE      POCT Blood Glucose.: 185 mg/dL (18 Oct 2018 08:09)  POCT Blood Glucose.: 270 mg/dL (17 Oct 2018 21:35)  POCT Blood Glucose.: 259 mg/dL (17 Oct 2018 16:37)  POCT Blood Glucose.: 219 mg/dL (17 Oct 2018 11:55)        RADIOLOGY & ADDITIONAL TESTS:    Imaging Personally Reviewed:  YES/NO    Consultant(s) Notes Reviewed:   YES/ No    Care Discussed with Consultants :     Plan of care was discussed with patient and /or primary care giver; all questions and concerns were addressed and care was aligned with patient's wishes.

## 2018-10-18 NOTE — PROGRESS NOTE ADULT - SUBJECTIVE AND OBJECTIVE BOX
Patient is a 83y old  Female who presents with a chief complaint of Hyperglycemia (17 Oct 2018 16:25)    pt seen in ed [  ], reg med floor [  x ], bed [  ], chair at bedside [ x  ], a+o x3 [ x ], lethargic [  ],  nad [ x ]        Allergies    No Known Allergies        Vitals    T(F): 99.5 (10-18-18 @ 05:41), Max: 99.5 (10-18-18 @ 05:41)  HR: 78 (10-18-18 @ 05:41) (67 - 78)  BP: 127/86 (10-18-18 @ 05:41) (108/59 - 127/86)  RR: 18 (10-18-18 @ 05:41) (17 - 18)  SpO2: 95% (10-18-18 @ 05:41) (95% - 99%)  Wt(kg): --  CAPILLARY BLOOD GLUCOSE      POCT Blood Glucose.: 185 mg/dL (18 Oct 2018 08:09)      Labs                          13.6   7.1   )-----------( 196      ( 18 Oct 2018 07:22 )             41.4       10-18    142  |  107  |  10  ----------------------------<  198<H>  4.4   |  26  |  0.84    Ca    9.2      18 Oct 2018 07:22  Phos  3.4     10-18  Mg     1.8     10-18              .Urine Clean Catch (Midstream)  10-14 @ 21:59   No growth  --  --          Radiology Results      Meds    MEDICATIONS  (STANDING):  ATENolol  Tablet 50 milliGRAM(s) Oral daily  cyanocobalamin Injectable 1000 MICROGram(s) IntraMuscular daily  dextrose 5%. 1000 milliLiter(s) (50 mL/Hr) IV Continuous <Continuous>  dextrose 50% Injectable 12.5 Gram(s) IV Push once  dextrose 50% Injectable 25 Gram(s) IV Push once  dextrose 50% Injectable 25 Gram(s) IV Push once  enoxaparin Injectable 40 milliGRAM(s) SubCutaneous daily  insulin glargine Injectable (LANTUS) 15 Unit(s) SubCutaneous at bedtime  insulin lispro (HumaLOG) corrective regimen sliding scale   SubCutaneous three times a day before meals  insulin lispro Injectable (HumaLOG) 3 Unit(s) SubCutaneous three times a day before meals  simvastatin 20 milliGRAM(s) Oral at bedtime      MEDICATIONS  (PRN):  dextrose 40% Gel 15 Gram(s) Oral once PRN Blood Glucose LESS THAN 70 milliGRAM(s)/deciLiter  glucagon  Injectable 1 milliGRAM(s) IntraMuscular once PRN Glucose <70 milliGRAM(s)/deciLiter      Physical Exam    Neuro :  no focal deficits  Respiratory: CTA B/L  CV: RRR, S1S2, no murmurs,   Abdominal: Soft, NT, ND +BS,  Extremities: No edema, + peripheral pulses    ASSESSMENT    uncontrolled dm  h/o DM type 2 (diabetes mellitus, type 2)  No significant past surgical history      PLAN      endo f/u   cont lantus  to 12units qhs and humalog 3 units premeals  Hba1C, 15 noted   serum glucose improving  phys tx eval noted and rec rolling walker and home phys tx  cont current meds  d/c plan for am if serum glucose stable Patient is a 83y old  Female who presents with a chief complaint of Hyperglycemia (17 Oct 2018 16:25)    pt seen in ed [  ], reg med floor [  x ], bed [  ], chair at bedside [ x  ], a+o x3 [ x ], lethargic [  ],  nad [ x ]        Allergies    No Known Allergies        Vitals    T(F): 99.5 (10-18-18 @ 05:41), Max: 99.5 (10-18-18 @ 05:41)  HR: 78 (10-18-18 @ 05:41) (67 - 78)  BP: 127/86 (10-18-18 @ 05:41) (108/59 - 127/86)  RR: 18 (10-18-18 @ 05:41) (17 - 18)  SpO2: 95% (10-18-18 @ 05:41) (95% - 99%)  Wt(kg): --  CAPILLARY BLOOD GLUCOSE      POCT Blood Glucose.: 185 mg/dL (18 Oct 2018 08:09)      Labs                          13.6   7.1   )-----------( 196      ( 18 Oct 2018 07:22 )             41.4       10-18    142  |  107  |  10  ----------------------------<  198<H>  4.4   |  26  |  0.84    Ca    9.2      18 Oct 2018 07:22  Phos  3.4     10-18  Mg     1.8     10-18              .Urine Clean Catch (Midstream)  10-14 @ 21:59   No growth  --  --          Radiology Results      Meds    MEDICATIONS  (STANDING):  ATENolol  Tablet 50 milliGRAM(s) Oral daily  cyanocobalamin Injectable 1000 MICROGram(s) IntraMuscular daily  dextrose 5%. 1000 milliLiter(s) (50 mL/Hr) IV Continuous <Continuous>  dextrose 50% Injectable 12.5 Gram(s) IV Push once  dextrose 50% Injectable 25 Gram(s) IV Push once  dextrose 50% Injectable 25 Gram(s) IV Push once  enoxaparin Injectable 40 milliGRAM(s) SubCutaneous daily  insulin glargine Injectable (LANTUS) 15 Unit(s) SubCutaneous at bedtime  insulin lispro (HumaLOG) corrective regimen sliding scale   SubCutaneous three times a day before meals  insulin lispro Injectable (HumaLOG) 3 Unit(s) SubCutaneous three times a day before meals  simvastatin 20 milliGRAM(s) Oral at bedtime      MEDICATIONS  (PRN):  dextrose 40% Gel 15 Gram(s) Oral once PRN Blood Glucose LESS THAN 70 milliGRAM(s)/deciLiter  glucagon  Injectable 1 milliGRAM(s) IntraMuscular once PRN Glucose <70 milliGRAM(s)/deciLiter      Physical Exam    Neuro :  no focal deficits  Respiratory: CTA B/L  CV: RRR, S1S2, no murmurs,   Abdominal: Soft, NT, ND +BS,  Extremities: No edema, + peripheral pulses    ASSESSMENT    uncontrolled dm  h/o DM type 2 (diabetes mellitus, type 2)  No significant past surgical history      PLAN    endo f/u   inc Lantus to15 units qhs   cont  humalog 3 units premeals  Hba1C, 15 noted   serum glucose improving  phys tx eval noted and rec rolling walker and home phys tx  cont current meds  d/c plan for am if serum glucose stable

## 2018-10-18 NOTE — PROGRESS NOTE ADULT - PROBLEM SELECTOR PLAN 1
-A1C: 15  - most likely because of poor diet compliance-   Lantus: increased to 15, Humalog 3 TID before meals  Finger sticks  -Endo follow up   Diabetic teaching to daughter

## 2018-10-19 DIAGNOSIS — R50.9 FEVER, UNSPECIFIED: ICD-10-CM

## 2018-10-19 LAB
ANION GAP SERPL CALC-SCNC: 11 MMOL/L — SIGNIFICANT CHANGE UP (ref 5–17)
BUN SERPL-MCNC: 14 MG/DL — SIGNIFICANT CHANGE UP (ref 7–18)
CALCIUM SERPL-MCNC: 9.3 MG/DL — SIGNIFICANT CHANGE UP (ref 8.4–10.5)
CHLORIDE SERPL-SCNC: 103 MMOL/L — SIGNIFICANT CHANGE UP (ref 96–108)
CO2 SERPL-SCNC: 25 MMOL/L — SIGNIFICANT CHANGE UP (ref 22–31)
CREAT SERPL-MCNC: 0.84 MG/DL — SIGNIFICANT CHANGE UP (ref 0.5–1.3)
GLUCOSE BLDC GLUCOMTR-MCNC: 173 MG/DL — HIGH (ref 70–99)
GLUCOSE BLDC GLUCOMTR-MCNC: 189 MG/DL — HIGH (ref 70–99)
GLUCOSE BLDC GLUCOMTR-MCNC: 201 MG/DL — HIGH (ref 70–99)
GLUCOSE BLDC GLUCOMTR-MCNC: 209 MG/DL — HIGH (ref 70–99)
GLUCOSE SERPL-MCNC: 264 MG/DL — HIGH (ref 70–99)
HCT VFR BLD CALC: 43.2 % — SIGNIFICANT CHANGE UP (ref 34.5–45)
HGB BLD-MCNC: 14.2 G/DL — SIGNIFICANT CHANGE UP (ref 11.5–15.5)
MCHC RBC-ENTMCNC: 30.3 PG — SIGNIFICANT CHANGE UP (ref 27–34)
MCHC RBC-ENTMCNC: 32.9 GM/DL — SIGNIFICANT CHANGE UP (ref 32–36)
MCV RBC AUTO: 92 FL — SIGNIFICANT CHANGE UP (ref 80–100)
PLATELET # BLD AUTO: 200 K/UL — SIGNIFICANT CHANGE UP (ref 150–400)
POTASSIUM SERPL-MCNC: 3.6 MMOL/L — SIGNIFICANT CHANGE UP (ref 3.5–5.3)
POTASSIUM SERPL-SCNC: 3.6 MMOL/L — SIGNIFICANT CHANGE UP (ref 3.5–5.3)
RAPID RVP RESULT: DETECTED
RBC # BLD: 4.7 M/UL — SIGNIFICANT CHANGE UP (ref 3.8–5.2)
RBC # FLD: 12.1 % — SIGNIFICANT CHANGE UP (ref 10.3–14.5)
RV+EV RNA SPEC QL NAA+PROBE: DETECTED
SODIUM SERPL-SCNC: 139 MMOL/L — SIGNIFICANT CHANGE UP (ref 135–145)
WBC # BLD: 7.1 K/UL — SIGNIFICANT CHANGE UP (ref 3.8–10.5)
WBC # FLD AUTO: 7.1 K/UL — SIGNIFICANT CHANGE UP (ref 3.8–10.5)

## 2018-10-19 PROCEDURE — 71045 X-RAY EXAM CHEST 1 VIEW: CPT | Mod: 26

## 2018-10-19 RX ORDER — INSULIN GLARGINE 100 [IU]/ML
20 INJECTION, SOLUTION SUBCUTANEOUS AT BEDTIME
Qty: 0 | Refills: 0 | Status: DISCONTINUED | OUTPATIENT
Start: 2018-10-19 | End: 2018-10-23

## 2018-10-19 RX ORDER — ACETAMINOPHEN 500 MG
650 TABLET ORAL EVERY 6 HOURS
Qty: 0 | Refills: 0 | Status: DISCONTINUED | OUTPATIENT
Start: 2018-10-19 | End: 2018-10-25

## 2018-10-19 RX ORDER — INSULIN LISPRO 100/ML
7 VIAL (ML) SUBCUTANEOUS
Qty: 0 | Refills: 0 | Status: DISCONTINUED | OUTPATIENT
Start: 2018-10-19 | End: 2018-10-25

## 2018-10-19 RX ORDER — INSULIN GLARGINE 100 [IU]/ML
18 INJECTION, SOLUTION SUBCUTANEOUS AT BEDTIME
Qty: 0 | Refills: 0 | Status: DISCONTINUED | OUTPATIENT
Start: 2018-10-19 | End: 2018-10-19

## 2018-10-19 RX ORDER — INSULIN LISPRO 100/ML
5 VIAL (ML) SUBCUTANEOUS
Qty: 0 | Refills: 0 | Status: DISCONTINUED | OUTPATIENT
Start: 2018-10-19 | End: 2018-10-19

## 2018-10-19 RX ADMIN — SIMVASTATIN 20 MILLIGRAM(S): 20 TABLET, FILM COATED ORAL at 22:23

## 2018-10-19 RX ADMIN — INSULIN GLARGINE 20 UNIT(S): 100 INJECTION, SOLUTION SUBCUTANEOUS at 22:23

## 2018-10-19 RX ADMIN — Medication 7 UNIT(S): at 13:26

## 2018-10-19 RX ADMIN — Medication 650 MILLIGRAM(S): at 05:52

## 2018-10-19 RX ADMIN — Medication 2: at 09:27

## 2018-10-19 RX ADMIN — ENOXAPARIN SODIUM 40 MILLIGRAM(S): 100 INJECTION SUBCUTANEOUS at 13:26

## 2018-10-19 RX ADMIN — Medication 1: at 13:25

## 2018-10-19 RX ADMIN — ATENOLOL 50 MILLIGRAM(S): 25 TABLET ORAL at 05:52

## 2018-10-19 RX ADMIN — PREGABALIN 1000 MICROGRAM(S): 225 CAPSULE ORAL at 13:26

## 2018-10-19 RX ADMIN — Medication 1: at 17:42

## 2018-10-19 RX ADMIN — Medication 7 UNIT(S): at 17:43

## 2018-10-19 NOTE — PROGRESS NOTE ADULT - SUBJECTIVE AND OBJECTIVE BOX
PGY 1 Note discussed with supervising resident and primary attending    Patient is a 83y old  Female who presents with a chief complaint of Hyperglycemia (18 Oct 2018 09:52)      INTERVAL HPI/OVERNIGHT EVENTS:     Patient seen at the bedside. Patient had a fever of 102 last night       MEDICATIONS  (STANDING):  ATENolol  Tablet 50 milliGRAM(s) Oral daily  cyanocobalamin Injectable 1000 MICROGram(s) IntraMuscular daily  dextrose 5%. 1000 milliLiter(s) (50 mL/Hr) IV Continuous <Continuous>  dextrose 50% Injectable 12.5 Gram(s) IV Push once  dextrose 50% Injectable 25 Gram(s) IV Push once  dextrose 50% Injectable 25 Gram(s) IV Push once  enoxaparin Injectable 40 milliGRAM(s) SubCutaneous daily  insulin glargine Injectable (LANTUS) 15 Unit(s) SubCutaneous at bedtime  insulin lispro (HumaLOG) corrective regimen sliding scale   SubCutaneous three times a day before meals  insulin lispro Injectable (HumaLOG) 3 Unit(s) SubCutaneous three times a day before meals  simvastatin 20 milliGRAM(s) Oral at bedtime    MEDICATIONS  (PRN):  acetaminophen   Tablet .. 650 milliGRAM(s) Oral every 6 hours PRN Temp greater or equal to 38C (100.4F)  dextrose 40% Gel 15 Gram(s) Oral once PRN Blood Glucose LESS THAN 70 milliGRAM(s)/deciLiter  glucagon  Injectable 1 milliGRAM(s) IntraMuscular once PRN Glucose <70 milliGRAM(s)/deciLiter      __________________________________________________  REVIEW OF SYSTEMS:    CONSTITUTIONAL:  fever,   EYES: no acute visual disturbances  NECK: No pain or stiffness  RESPIRATORY: No cough; No shortness of breath  CARDIOVASCULAR: No chest pain, no palpitations  GASTROINTESTINAL: No pain. No nausea or vomiting; No diarrhea   NEUROLOGICAL: No headache or numbness, no tremors  MUSCULOSKELETAL: No joint pain, no muscle pain  GENITOURINARY: no dysuria, no frequency, no hesitancy  PSYCHIATRY: no depression , no anxiety  ALL OTHER  ROS negative        Vital Signs Last 24 Hrs  T(C): 38.9 (19 Oct 2018 05:38), Max: 38.9 (19 Oct 2018 05:38)  T(F): 102 (19 Oct 2018 05:38), Max: 102 (19 Oct 2018 05:38)  HR: 92 (19 Oct 2018 05:38) (79 - 97)  BP: 131/86 (19 Oct 2018 05:38) (118/69 - 131/86)  BP(mean): --  RR: 18 (19 Oct 2018 05:38) (18 - 18)  SpO2: 94% (19 Oct 2018 05:38) (94% - 99%)    ________________________________________________  PHYSICAL EXAM:  GENERAL: NAD, blind   HEENT: Normocephalic;  conjunctivae and sclerae clear; moist mucous membranes;   NECK : supple  CHEST/LUNG: Clear to auscultation bilaterally with good air entry   HEART: S1 S2  regular; no murmurs, gallops or rubs  ABDOMEN: Soft, Nontender, Nondistended; Bowel sounds present  EXTREMITIES: no cyanosis; no edema; no calf tenderness  SKIN: warm and dry; no rash  NERVOUS SYSTEM:  Awake and alert; Oriented  to place, person and time ; no new deficits    _________________________________________________  LABS:                        13.6   7.1   )-----------( 196      ( 18 Oct 2018 07:22 )             41.4     10-18    142  |  107  |  10  ----------------------------<  198<H>  4.4   |  26  |  0.84    Ca    9.2      18 Oct 2018 07:22  Phos  3.4     10-18  Mg     1.8     10-18          CAPILLARY BLOOD GLUCOSE      POCT Blood Glucose.: 229 mg/dL (18 Oct 2018 21:40)  POCT Blood Glucose.: 174 mg/dL (18 Oct 2018 16:53)  POCT Blood Glucose.: 287 mg/dL (18 Oct 2018 11:58)  POCT Blood Glucose.: 185 mg/dL (18 Oct 2018 08:09)        RADIOLOGY & ADDITIONAL TESTS:    Imaging Personally Reviewed:  YES/NO    Consultant(s) Notes Reviewed:   YES/ No    Care Discussed with Consultants :     Plan of care was discussed with patient and /or primary care giver; all questions and concerns were addressed and care was aligned with patient's wishes.

## 2018-10-19 NOTE — PROGRESS NOTE ADULT - SUBJECTIVE AND OBJECTIVE BOX
Patient is a 83y old  Female who presents with a chief complaint of Hyperglycemia (19 Oct 2018 07:45)    pt seen in ed [  ], reg med floor [  x ], bed [  ], chair at bedside [ x  ], a+o x3 [ x ], lethargic [  ],  nad [ x ]    pt with fever overnight      Allergies    No Known Allergies        Vitals    T(F): 102 (10-19-18 @ 05:38), Max: 102 (10-19-18 @ 05:38)  HR: 92 (10-19-18 @ 05:38) (79 - 97)  BP: 131/86 (10-19-18 @ 05:38) (118/69 - 131/86)  RR: 18 (10-19-18 @ 05:38) (18 - 18)  SpO2: 94% (10-19-18 @ 05:38) (94% - 99%)  Wt(kg): --  CAPILLARY BLOOD GLUCOSE      POCT Blood Glucose.: 209 mg/dL (19 Oct 2018 08:19)      Labs                          14.2   7.1   )-----------( 200      ( 19 Oct 2018 11:12 )             43.2       10-19    139  |  103  |  14  ----------------------------<  264<H>  3.6   |  25  |  0.84    Ca    9.3      19 Oct 2018 11:10  Phos  3.4     10-18  Mg     1.8     10-18              .Urine Clean Catch (Midstream)  10-14 @ 21:59   No growth  --  --          Radiology Results      Meds    MEDICATIONS  (STANDING):  ATENolol  Tablet 50 milliGRAM(s) Oral daily  cyanocobalamin Injectable 1000 MICROGram(s) IntraMuscular daily  dextrose 5%. 1000 milliLiter(s) (50 mL/Hr) IV Continuous <Continuous>  dextrose 50% Injectable 12.5 Gram(s) IV Push once  dextrose 50% Injectable 25 Gram(s) IV Push once  dextrose 50% Injectable 25 Gram(s) IV Push once  enoxaparin Injectable 40 milliGRAM(s) SubCutaneous daily  insulin glargine Injectable (LANTUS) 20 Unit(s) SubCutaneous at bedtime  insulin lispro (HumaLOG) corrective regimen sliding scale   SubCutaneous three times a day before meals  insulin lispro Injectable (HumaLOG) 7 Unit(s) SubCutaneous three times a day before meals  simvastatin 20 milliGRAM(s) Oral at bedtime      MEDICATIONS  (PRN):  acetaminophen   Tablet .. 650 milliGRAM(s) Oral every 6 hours PRN Temp greater or equal to 38C (100.4F)  dextrose 40% Gel 15 Gram(s) Oral once PRN Blood Glucose LESS THAN 70 milliGRAM(s)/deciLiter  glucagon  Injectable 1 milliGRAM(s) IntraMuscular once PRN Glucose <70 milliGRAM(s)/deciLiter      Physical Exam      Neuro :  no focal deficits  Respiratory: CTA B/L  CV: RRR, S1S2, no murmurs,   Abdominal: Soft, NT, ND +BS,  Extremities: No edema, + peripheral pulses    ASSESSMENT      fever ?source  uncontrolled dm  h/o DM type 2 (diabetes mellitus, type 2)  No significant past surgical history      PLAN      f/u pan cx  f/u cxr  endo f/u   Lantus incr to15 units qhs   humalog incr to 7 units premeals  Hba1C, 15 noted   serum glucose improving  phys tx eval noted and rec rolling walker and home phys tx  cont current meds Patient is a 83y old  Female who presents with a chief complaint of Hyperglycemia (19 Oct 2018 07:45)    pt seen in ed [  ], reg med floor [  x ], bed [  ], chair at bedside [ x  ], a+o x3 [ x ], lethargic [  ],  nad [ x ]    pt with fever overnight      Allergies    No Known Allergies        Vitals    T(F): 102 (10-19-18 @ 05:38), Max: 102 (10-19-18 @ 05:38)  HR: 92 (10-19-18 @ 05:38) (79 - 97)  BP: 131/86 (10-19-18 @ 05:38) (118/69 - 131/86)  RR: 18 (10-19-18 @ 05:38) (18 - 18)  SpO2: 94% (10-19-18 @ 05:38) (94% - 99%)  Wt(kg): --  CAPILLARY BLOOD GLUCOSE      POCT Blood Glucose.: 209 mg/dL (19 Oct 2018 08:19)      Labs                          14.2   7.1   )-----------( 200      ( 19 Oct 2018 11:12 )             43.2       10-19    139  |  103  |  14  ----------------------------<  264<H>  3.6   |  25  |  0.84    Ca    9.3      19 Oct 2018 11:10  Phos  3.4     10-18  Mg     1.8     10-18              .Urine Clean Catch (Midstream)  10-14 @ 21:59   No growth  --  --          Radiology Results      Meds    MEDICATIONS  (STANDING):  ATENolol  Tablet 50 milliGRAM(s) Oral daily  cyanocobalamin Injectable 1000 MICROGram(s) IntraMuscular daily  dextrose 5%. 1000 milliLiter(s) (50 mL/Hr) IV Continuous <Continuous>  dextrose 50% Injectable 12.5 Gram(s) IV Push once  dextrose 50% Injectable 25 Gram(s) IV Push once  dextrose 50% Injectable 25 Gram(s) IV Push once  enoxaparin Injectable 40 milliGRAM(s) SubCutaneous daily  insulin glargine Injectable (LANTUS) 20 Unit(s) SubCutaneous at bedtime  insulin lispro (HumaLOG) corrective regimen sliding scale   SubCutaneous three times a day before meals  insulin lispro Injectable (HumaLOG) 7 Unit(s) SubCutaneous three times a day before meals  simvastatin 20 milliGRAM(s) Oral at bedtime      MEDICATIONS  (PRN):  acetaminophen   Tablet .. 650 milliGRAM(s) Oral every 6 hours PRN Temp greater or equal to 38C (100.4F)  dextrose 40% Gel 15 Gram(s) Oral once PRN Blood Glucose LESS THAN 70 milliGRAM(s)/deciLiter  glucagon  Injectable 1 milliGRAM(s) IntraMuscular once PRN Glucose <70 milliGRAM(s)/deciLiter      Physical Exam      Neuro :  no focal deficits  Respiratory: mild wheezing  B/L  CV: RRR, S1S2, no murmurs,   Abdominal: Soft, NT, ND +BS,  Extremities: No edema, + peripheral pulses    ASSESSMENT      fever ?source  uncontrolled dm  h/o DM type 2 (diabetes mellitus, type 2)  No significant past surgical history      PLAN      f/u pan cx  f/u cxr  endo f/u   Lantus incr to15 units qhs   humalog incr to 7 units premeals  Hba1C, 15 noted   serum glucose improving  phys tx eval noted and rec rolling walker and home phys tx  cont current meds

## 2018-10-20 LAB
GLUCOSE BLDC GLUCOMTR-MCNC: 157 MG/DL — HIGH (ref 70–99)
GLUCOSE BLDC GLUCOMTR-MCNC: 219 MG/DL — HIGH (ref 70–99)
GLUCOSE BLDC GLUCOMTR-MCNC: 251 MG/DL — HIGH (ref 70–99)
GLUCOSE BLDC GLUCOMTR-MCNC: 89 MG/DL — SIGNIFICANT CHANGE UP (ref 70–99)

## 2018-10-20 RX ADMIN — Medication 650 MILLIGRAM(S): at 07:31

## 2018-10-20 RX ADMIN — SIMVASTATIN 20 MILLIGRAM(S): 20 TABLET, FILM COATED ORAL at 23:04

## 2018-10-20 RX ADMIN — ENOXAPARIN SODIUM 40 MILLIGRAM(S): 100 INJECTION SUBCUTANEOUS at 11:29

## 2018-10-20 RX ADMIN — INSULIN GLARGINE 20 UNIT(S): 100 INJECTION, SOLUTION SUBCUTANEOUS at 23:04

## 2018-10-20 RX ADMIN — Medication 7 UNIT(S): at 08:21

## 2018-10-20 RX ADMIN — ATENOLOL 50 MILLIGRAM(S): 25 TABLET ORAL at 06:09

## 2018-10-20 RX ADMIN — Medication 3: at 11:28

## 2018-10-20 RX ADMIN — Medication 7 UNIT(S): at 11:29

## 2018-10-20 RX ADMIN — Medication 650 MILLIGRAM(S): at 08:23

## 2018-10-20 RX ADMIN — Medication 7 UNIT(S): at 17:37

## 2018-10-20 RX ADMIN — Medication 1: at 08:21

## 2018-10-20 RX ADMIN — PREGABALIN 1000 MICROGRAM(S): 225 CAPSULE ORAL at 11:29

## 2018-10-20 NOTE — PROGRESS NOTE ADULT - SUBJECTIVE AND OBJECTIVE BOX
Patient is a 83y old  Female who presents with a chief complaint of Hyperglycemia (19 Oct 2018 11:52)    pt seen in ed [  ], reg med floor [  x ], bed [  ], chair at bedside [ x  ], a+o x3 [ x ], lethargic [  ],  nad [ x ]      Allergies    No Known Allergies        Vitals    T(F): 99.5 (10-20-18 @ 08:23), Max: 100.7 (10-20-18 @ 05:26)  HR: 81 (10-20-18 @ 05:26) (71 - 81)  BP: 131/72 (10-20-18 @ 05:26) (97/60 - 131/72)  RR: 18 (10-20-18 @ 05:26) (18 - 18)  SpO2: 96% (10-20-18 @ 05:26) (96% - 98%)  Wt(kg): --  CAPILLARY BLOOD GLUCOSE      POCT Blood Glucose.: 157 mg/dL (20 Oct 2018 07:52)      Labs                          14.2   7.1   )-----------( 200      ( 19 Oct 2018 11:12 )             43.2       10-19    139  |  103  |  14  ----------------------------<  264<H>  3.6   |  25  |  0.84    Ca    9.3      19 Oct 2018 11:10      Rapid RVP Result: Detected: The [employer] RVP Rapid uses polymerase chain reaction (PCR) and melt  curve analysis to screen for adenovirus; coronavirus HKU1, NL63, 229E,  OC43; human metapneumovirus (hMPV); human enterovirus/rhinovirus  (Entero/RV); influenza A; influenza A/H1;influenza A/H3; influenza  A/H1-2009; influenza B; parainfluenza viruses 1, 2, 3, 4; respiratory  syncytial virus; Bordetella pertussis; Mycoplasma pneumoniae; and  Chlamydophila pneumoniae. (10.19.18 @ 15:18)            .Urine Clean Catch (Midstream)  10-14 @ 21:59   No growth  --  --          Radiology Results    < from: Xray Chest 1 View-PORTABLE IMMEDIATE (10.19.18 @ 11:53) >  IMPRESSION:  Left apical pleural thickening without significant change.    No consolidation or pleural effusion.    Heart size cannot be accurately assessed in this projection.    < end of copied text >        Meds    MEDICATIONS  (STANDING):  ATENolol  Tablet 50 milliGRAM(s) Oral daily  cyanocobalamin Injectable 1000 MICROGram(s) IntraMuscular daily  dextrose 5%. 1000 milliLiter(s) (50 mL/Hr) IV Continuous <Continuous>  dextrose 50% Injectable 12.5 Gram(s) IV Push once  dextrose 50% Injectable 25 Gram(s) IV Push once  dextrose 50% Injectable 25 Gram(s) IV Push once  enoxaparin Injectable 40 milliGRAM(s) SubCutaneous daily  insulin glargine Injectable (LANTUS) 20 Unit(s) SubCutaneous at bedtime  insulin lispro (HumaLOG) corrective regimen sliding scale   SubCutaneous three times a day before meals  insulin lispro Injectable (HumaLOG) 7 Unit(s) SubCutaneous three times a day before meals  simvastatin 20 milliGRAM(s) Oral at bedtime      MEDICATIONS  (PRN):  acetaminophen   Tablet .. 650 milliGRAM(s) Oral every 6 hours PRN Temp greater or equal to 38C (100.4F)  dextrose 40% Gel 15 Gram(s) Oral once PRN Blood Glucose LESS THAN 70 milliGRAM(s)/deciLiter  glucagon  Injectable 1 milliGRAM(s) IntraMuscular once PRN Glucose <70 milliGRAM(s)/deciLiter      Physical Exam      Neuro :  no focal deficits  Respiratory: mild wheezing  B/L  CV: RRR, S1S2, no murmurs,   Abdominal: Soft, NT, ND +BS,  Extremities: No edema, + peripheral pulses    ASSESSMENT      fever 2nd to entero/rhino virus  uncontrolled dm  h/o DM type 2 (diabetes mellitus, type 2)  No significant past surgical history      PLAN      f/u blood cx  ucx neg noted above   cxr neg for acute patho noted above  rapid rvp with entero/rhino virus noted above  cont antipyretics prn  endo f/u   Lantus incr to 20 units qhs   cont humalog 7 units premeals  Hba1C, 15 noted   serum glucose improving  phys tx eval noted and rec rolling walker and home phys tx  cont current meds

## 2018-10-21 ENCOUNTER — TRANSCRIPTION ENCOUNTER (OUTPATIENT)
Age: 83
End: 2018-10-21

## 2018-10-21 LAB
GLUCOSE BLDC GLUCOMTR-MCNC: 161 MG/DL — HIGH (ref 70–99)
GLUCOSE BLDC GLUCOMTR-MCNC: 186 MG/DL — HIGH (ref 70–99)
GLUCOSE BLDC GLUCOMTR-MCNC: 232 MG/DL — HIGH (ref 70–99)
GLUCOSE BLDC GLUCOMTR-MCNC: 78 MG/DL — SIGNIFICANT CHANGE UP (ref 70–99)

## 2018-10-21 RX ORDER — IPRATROPIUM/ALBUTEROL SULFATE 18-103MCG
3 AEROSOL WITH ADAPTER (GRAM) INHALATION EVERY 6 HOURS
Qty: 0 | Refills: 0 | Status: DISCONTINUED | OUTPATIENT
Start: 2018-10-21 | End: 2018-10-25

## 2018-10-21 RX ADMIN — SIMVASTATIN 20 MILLIGRAM(S): 20 TABLET, FILM COATED ORAL at 22:00

## 2018-10-21 RX ADMIN — Medication 7 UNIT(S): at 12:04

## 2018-10-21 RX ADMIN — ENOXAPARIN SODIUM 40 MILLIGRAM(S): 100 INJECTION SUBCUTANEOUS at 12:05

## 2018-10-21 RX ADMIN — ATENOLOL 50 MILLIGRAM(S): 25 TABLET ORAL at 06:32

## 2018-10-21 RX ADMIN — INSULIN GLARGINE 20 UNIT(S): 100 INJECTION, SOLUTION SUBCUTANEOUS at 22:40

## 2018-10-21 RX ADMIN — Medication 3 MILLILITER(S): at 15:16

## 2018-10-21 RX ADMIN — Medication 3 MILLILITER(S): at 20:28

## 2018-10-21 RX ADMIN — Medication 7 UNIT(S): at 08:22

## 2018-10-21 RX ADMIN — Medication 1: at 12:04

## 2018-10-21 RX ADMIN — PREGABALIN 1000 MICROGRAM(S): 225 CAPSULE ORAL at 12:05

## 2018-10-21 RX ADMIN — Medication 1: at 08:21

## 2018-10-21 NOTE — DISCHARGE NOTE ADULT - ADDITIONAL INSTRUCTIONS
Pulmonology recommended out patient Pulmonary function tests. You are recommended follow up with pulmonologist.

## 2018-10-21 NOTE — DISCHARGE NOTE ADULT - PATIENT PORTAL LINK FT
You can access the [facility] Patient Portal, offered by [facility], by registering with the following website: [email or web address]

## 2018-10-21 NOTE — PROGRESS NOTE ADULT - PROBLEM SELECTOR PLAN 1
patient had a fever of 102 last night   Blood Culture: Negative  UA: negative  CXR: no acute path  RVP: positive

## 2018-10-21 NOTE — PROGRESS NOTE ADULT - SUBJECTIVE AND OBJECTIVE BOX
PGY 1 Note discussed with supervising resident and primary attending    Patient is a 83y old  Female who presents with a chief complaint of Hyperglycemia (21 Oct 2018 09:18)      INTERVAL HPI/OVERNIGHT EVENTS:   Patient seen at the bedside. No new complains      MEDICATIONS  (STANDING):  ALBUTerol/ipratropium for Nebulization 3 milliLiter(s) Nebulizer every 6 hours  ATENolol  Tablet 50 milliGRAM(s) Oral daily  cyanocobalamin Injectable 1000 MICROGram(s) IntraMuscular daily  dextrose 5%. 1000 milliLiter(s) (50 mL/Hr) IV Continuous <Continuous>  dextrose 50% Injectable 12.5 Gram(s) IV Push once  dextrose 50% Injectable 25 Gram(s) IV Push once  dextrose 50% Injectable 25 Gram(s) IV Push once  enoxaparin Injectable 40 milliGRAM(s) SubCutaneous daily  insulin glargine Injectable (LANTUS) 20 Unit(s) SubCutaneous at bedtime  insulin lispro (HumaLOG) corrective regimen sliding scale   SubCutaneous three times a day before meals  insulin lispro Injectable (HumaLOG) 7 Unit(s) SubCutaneous three times a day before meals  simvastatin 20 milliGRAM(s) Oral at bedtime    MEDICATIONS  (PRN):  acetaminophen   Tablet .. 650 milliGRAM(s) Oral every 6 hours PRN Temp greater or equal to 38C (100.4F)  dextrose 40% Gel 15 Gram(s) Oral once PRN Blood Glucose LESS THAN 70 milliGRAM(s)/deciLiter  glucagon  Injectable 1 milliGRAM(s) IntraMuscular once PRN Glucose <70 milliGRAM(s)/deciLiter      __________________________________________________  REVIEW OF SYSTEMS:    CONSTITUTIONAL: No fever,   EYES: no acute visual disturbances  NECK: No pain or stiffness  RESPIRATORY: No cough; No shortness of breath  CARDIOVASCULAR: No chest pain, no palpitations  GASTROINTESTINAL: No pain. No nausea or vomiting; No diarrhea   NEUROLOGICAL: No headache or numbness, no tremors  MUSCULOSKELETAL: No joint pain, no muscle pain  GENITOURINARY: no dysuria, no frequency, no hesitancy  PSYCHIATRY: no depression , no anxiety  ALL OTHER  ROS negative        Vital Signs Last 24 Hrs  T(C): 36.9 (21 Oct 2018 05:15), Max: 36.9 (21 Oct 2018 05:15)  T(F): 98.5 (21 Oct 2018 05:15), Max: 98.5 (21 Oct 2018 05:15)  HR: 80 (21 Oct 2018 05:15) (65 - 80)  BP: 134/66 (21 Oct 2018 05:15) (110/60 - 134/66)  BP(mean): --  RR: 17 (21 Oct 2018 05:15) (17 - 17)  SpO2: 94% (21 Oct 2018 05:15) (94% - 95%)    ________________________________________________  PHYSICAL EXAM:  GENERAL: NAD, vision impaired   HEENT: Normocephalic;  conjunctivae and sclerae clear; moist mucous membranes;   NECK : supple  CHEST/LUNG: Clear to auscultation bilaterally with good air entry   HEART: S1 S2  regular; no murmurs, gallops or rubs  ABDOMEN: Soft, Nontender, Nondistended; Bowel sounds present  EXTREMITIES: no cyanosis; no edema; no calf tenderness  SKIN: warm and dry; no rash  NERVOUS SYSTEM:  Awake and alert; Oriented  to place, person and time ; no new deficits    _________________________________________________  LABS:                        14.2   7.1   )-----------( 200      ( 19 Oct 2018 11:12 )             43.2     10-19    139  |  103  |  14  ----------------------------<  264<H>  3.6   |  25  |  0.84    Ca    9.3      19 Oct 2018 11:10          CAPILLARY BLOOD GLUCOSE      POCT Blood Glucose.: 161 mg/dL (21 Oct 2018 07:58)  POCT Blood Glucose.: 219 mg/dL (20 Oct 2018 21:58)  POCT Blood Glucose.: 89 mg/dL (20 Oct 2018 16:45)  POCT Blood Glucose.: 251 mg/dL (20 Oct 2018 11:14)        RADIOLOGY & ADDITIONAL TESTS:    Imaging Personally Reviewed:  YES/NO    Consultant(s) Notes Reviewed:   YES/ No    Care Discussed with Consultants :     Plan of care was discussed with patient and /or primary care giver; all questions and concerns were addressed and care was aligned with patient's wishes.

## 2018-10-21 NOTE — PROGRESS NOTE ADULT - SUBJECTIVE AND OBJECTIVE BOX
Patient is a 83y old  Female who presents with a chief complaint of Hyperglycemia (20 Oct 2018 10:12)    pt seen in ed [  ], reg med floor [  x ], bed [  ], chair at bedside [ x  ], a+o x3 [ x ], lethargic [  ],  nad [ x ]      Allergies    No Known Allergies        Vitals    T(F): 98.5 (10-21-18 @ 05:15), Max: 98.5 (10-21-18 @ 05:15)  HR: 80 (10-21-18 @ 05:15) (65 - 80)  BP: 134/66 (10-21-18 @ 05:15) (110/60 - 134/66)  RR: 17 (10-21-18 @ 05:15) (17 - 17)  SpO2: 94% (10-21-18 @ 05:15) (94% - 95%)  Wt(kg): --  CAPILLARY BLOOD GLUCOSE      POCT Blood Glucose.: 161 mg/dL (21 Oct 2018 07:58)      Labs                          14.2   7.1   )-----------( 200      ( 19 Oct 2018 11:12 )             43.2       10-19    139  |  103  |  14  ----------------------------<  264<H>  3.6   |  25  |  0.84    Ca    9.3      19 Oct 2018 11:10              .Blood Blood-Peripheral  10-19 @ 14:19   No growth to date.  --  --      .Urine Clean Catch (Midstream)  10-14 @ 21:59   No growth  --  --          Radiology Results      Meds    MEDICATIONS  (STANDING):  ATENolol  Tablet 50 milliGRAM(s) Oral daily  cyanocobalamin Injectable 1000 MICROGram(s) IntraMuscular daily  dextrose 5%. 1000 milliLiter(s) (50 mL/Hr) IV Continuous <Continuous>  dextrose 50% Injectable 12.5 Gram(s) IV Push once  dextrose 50% Injectable 25 Gram(s) IV Push once  dextrose 50% Injectable 25 Gram(s) IV Push once  enoxaparin Injectable 40 milliGRAM(s) SubCutaneous daily  insulin glargine Injectable (LANTUS) 20 Unit(s) SubCutaneous at bedtime  insulin lispro (HumaLOG) corrective regimen sliding scale   SubCutaneous three times a day before meals  insulin lispro Injectable (HumaLOG) 7 Unit(s) SubCutaneous three times a day before meals  simvastatin 20 milliGRAM(s) Oral at bedtime      MEDICATIONS  (PRN):  acetaminophen   Tablet .. 650 milliGRAM(s) Oral every 6 hours PRN Temp greater or equal to 38C (100.4F)  dextrose 40% Gel 15 Gram(s) Oral once PRN Blood Glucose LESS THAN 70 milliGRAM(s)/deciLiter  glucagon  Injectable 1 milliGRAM(s) IntraMuscular once PRN Glucose <70 milliGRAM(s)/deciLiter      Physical Exam      Neuro :  no focal deficits  Respiratory: mild wheezing  B/L  CV: RRR, S1S2, no murmurs,   Abdominal: Soft, NT, ND +BS,  Extremities: No edema, + peripheral pulses    ASSESSMENT      fever 2nd to entero/rhino virus  uncontrolled dm  h/o DM type 2 (diabetes mellitus, type 2)  No significant past surgical history      PLAN      f/u blood cx  ucx neg noted above   cxr neg for acute patho noted above  rapid rvp with entero/rhino virus noted above  cont antipyretics prn  add duonebs q6  endo f/u   cont Lantus 20 units qhs   cont humalog 7 units premeals  Hba1C, 15 noted   serum glucose improving  phys tx eval noted and rec rolling walker and home phys tx  cont current meds

## 2018-10-21 NOTE — DISCHARGE NOTE ADULT - CARE PLAN
Principal Discharge DX:	Diabetes  Goal:	A1C<7  Assessment and plan of treatment:	YOu presented with A1C of 15 most likely due to dietary non compliance. You are recommended to take Lantus ---- units at bedtime, humalog --- units before meals 3 times a day and regularly check your blood glucose levels. Maintain compliance with medications and low carb diet, avoid excessive sweets and follow up with PCP in 1 week  Secondary Diagnosis:	HLD (hyperlipidemia)  Assessment and plan of treatment:	YOu are recommended to take statin as advised, low fat diet and follow up with PCP  Secondary Diagnosis:	Fever  Assessment and plan of treatment:	YOu had fever in the hospital, RVP: positive, blood culture, UA and CXR: normal.   You are recommended to follow up with PCP  Secondary Diagnosis:	HTN (hypertension)  Assessment and plan of treatment:	YOu are recommended to take medications as advised and follow up with PCP Principal Discharge DX:	Diabetes  Goal:	A1C<7  Assessment and plan of treatment:	YOu presented with A1C of 15 most likely due to dietary non compliance. You are recommended to take Lantus ---- units at bedtime, humalog --- units before meals 3 times a day and regularly check your blood glucose levels. Maintain compliance with medications and low carb diet, avoid excessive sweets and follow up with PCP in 1 week  Secondary Diagnosis:	HLD (hyperlipidemia)  Assessment and plan of treatment:	YOu are recommended to take statin as advised, low fat diet and follow up with PCP  Secondary Diagnosis:	Fever  Assessment and plan of treatment:	YOu had fever in the hospital, RVP: positive, blood culture, UA and CXR: normal. You continued to wheeze despite treatment. Pulmonology was consulted and they recommended PFTs on OPD bases.  Pt is taking levofloxacin for last 3 days and will continue taking it for 4 more days.  You are recommended to follow up with PCP  Secondary Diagnosis:	HTN (hypertension)  Assessment and plan of treatment:	YOu are recommended to take medications as advised and follow up with PCP Principal Discharge DX:	Diabetes  Goal:	A1C<7  Assessment and plan of treatment:	YOu presented with A1C of 15 most likely due to dietary non compliance. You are recommended to take Lantus 20 units at bedtime, humalog 7 units before meals 3 times a day and regularly check your blood glucose levels. Maintain compliance with medications and low carb diet, avoid excessive sweets and follow up with PCP in 1 week  Secondary Diagnosis:	HLD (hyperlipidemia)  Assessment and plan of treatment:	YOu are recommended to take statin as advised, low fat diet and follow up with PCP  Secondary Diagnosis:	Fever  Assessment and plan of treatment:	YOu had fever in the hospital, RVP: positive, blood culture, UA and CXR: normal. You continued to wheeze despite treatment. Pulmonology was consulted and they recommended PFTs on OPD bases.  Pt is taking levofloxacin for last 3 days and will continue taking it for 4 more days.  You are recommended to follow up with PCP  Secondary Diagnosis:	HTN (hypertension)  Assessment and plan of treatment:	YOu are recommended to take medications as advised and follow up with PCP

## 2018-10-21 NOTE — DISCHARGE NOTE ADULT - MEDICATION SUMMARY - MEDICATIONS TO TAKE
I will START or STAY ON the medications listed below when I get home from the hospital:    glucometer with test strips  -- 1 application subcutaneous 3 times a day   -- Indication: For Diabetes    lancet needles  -- 1 application subcutaneous 3 times a day   -- Indication: For Diabetes    walker  -- walker  -- Indication: For Device    HumaLOG KwikPen 200 units/mL (Concentrated) subcutaneous solution  -- 7 unit(s) subcutaneous 3 times a day (before meals)   -- Do not drink alcoholic beverages when taking this medication.  It is very important that you take or use this exactly as directed.  Do not skip doses or discontinue unless directed by your doctor.  Keep in refrigerator.  Do not freeze.    -- Indication: For Diabetes    Lantus 100 units/mL subcutaneous solution  -- 20 unit(s) subcutaneous once a day (at bedtime)  -- Indication: For Diabetes    simvastatin 20 mg oral tablet  -- 1 tab(s) by mouth once a day (at bedtime)  -- Indication: For Hyperlipidemia    atenolol 50 mg oral tablet  -- 1 tab(s) by mouth once a day  -- Indication: For HTN (hypertension)    Levaquin 500 mg oral tablet  -- 1 tab(s) by mouth every 24 hours  -- Indication: For Non resolving cough with wheeze I will START or STAY ON the medications listed below when I get home from the hospital:    glucometer with test strips  -- 1 application subcutaneous 3 times a day   -- Indication: For Diabetes    lancet needles  -- 1 application subcutaneous 3 times a day   -- Indication: For Diabetes    walker  -- walker  -- Indication: For Device    simvastatin 20 mg oral tablet  -- 1 tab(s) by mouth once a day (at bedtime)  -- Indication: For Hyperlipidemia    atenolol 50 mg oral tablet  -- 1 tab(s) by mouth once a day  -- Indication: For HTN (hypertension)

## 2018-10-21 NOTE — DISCHARGE NOTE ADULT - PLAN OF CARE
A1C<7 YOu presented with A1C of 15 most likely due to dietary non compliance. You are recommended to take Lantus ---- units at bedtime, humalog --- units before meals 3 times a day and regularly check your blood glucose levels. Maintain compliance with medications and low carb diet, avoid excessive sweets and follow up with PCP in 1 week YOu are recommended to take statin as advised, low fat diet and follow up with PCP YOu had fever in the hospital, RVP: positive, blood culture, UA and CXR: normal.   You are recommended to follow up with PCP YOu are recommended to take medications as advised and follow up with PCP YOu had fever in the hospital, RVP: positive, blood culture, UA and CXR: normal. You continued to wheeze despite treatment. Pulmonology was consulted and they recommended PFTs on OPD bases.  Pt is taking levofloxacin for last 3 days and will continue taking it for 4 more days.  You are recommended to follow up with PCP YOu presented with A1C of 15 most likely due to dietary non compliance. You are recommended to take Lantus 20 units at bedtime, humalog 7 units before meals 3 times a day and regularly check your blood glucose levels. Maintain compliance with medications and low carb diet, avoid excessive sweets and follow up with PCP in 1 week

## 2018-10-21 NOTE — DISCHARGE NOTE ADULT - CARE PROVIDER_API CALL
Mina Crook), Internal Medicine; Pulmonary Disease  [address] [place] [address]  Phone: [phone]  Fax: [phone]

## 2018-10-21 NOTE — DISCHARGE NOTE ADULT - HOSPITAL COURSE
Pt is an 84 y/o F, from home, with a PMHx of DM type II (oral medication no insulin) who presents to the ED with complaints of elevated blood glucose at home.  Pt is a poor historian and history obtained over the phone from daughter, Chantal, who notes blood glucose was elevated yesterday in the 300's.  Pt continued to take her PO glipized as scheduled.  This AM her glucose was 340 before breakfast.  Pt also complained of urinary urgency and dysuria.  Daughter also notes pt has been drinking a lot of water lately.  Pt denies HA, blurred vision, diaphoresis, weakness, loss of apetite, lethargy/ confusion, or dizziness.  Pt last saw her PCP, Dr. Zarate, 1 mo prior and blood sugars were fine at that time.  Pt was switched from metformin to Glipizide 6mo prior, and sugars have been well controlled.  Pt is compliant with medication.  Pt's diet is poor.      In the ED, pt presents in no acute distress, and vitals WNL    Patient was admitted with A1c of over 15, started on lantus and lispro. Patient's daughter taught about insulin injections.   During hospital course, patient had a fever of 102. Blood culture: negative, UA : normal. RVP: positive..     Patient is medically stable to be discharged. Case discussed with the attending Pt is an 84 y/o F, from home, with a PMHx of DM type II (oral medication no insulin) who presents to the ED with complaints of elevated blood glucose at home.  Pt is a poor historian and history obtained over the phone from daughter, Chantal, who notes blood glucose was elevated yesterday in the 300's.  Pt continued to take her PO glipized as scheduled.  This AM her glucose was 340 before breakfast.  Pt also complained of urinary urgency and dysuria.  Daughter also notes pt has been drinking a lot of water lately.  Pt denies HA, blurred vision, diaphoresis, weakness, loss of apetite, lethargy/ confusion, or dizziness.  Pt last saw her PCP, Dr. Zarate, 1 mo prior and blood sugars were fine at that time.  Pt was switched from metformin to Glipizide 6mo prior, and sugars have been well controlled.  Pt is compliant with medication.  Pt's diet is poor.      In the ED, pt presents in no acute distress, and vitals WNL    Patient was admitted with A1c of over 15, started on lantus and lispro. Patient's daughter taught about insulin injections.   During hospital course, patient had a fever of 102. Blood culture: negative, UA : normal. RVP: positive..   You continued to wheeze despite treatment. Pulmonology was consulted and they recommended PFTs on OPD bases. Pt wheezing and cough improved with 3 day course of prednisone 20mg.  Pt is taking levofloxacin for last 3 days and will continue taking it for 4 more days.    Patient is medically stable to be discharged. Case discussed with the attending

## 2018-10-22 DIAGNOSIS — J92.9 PLEURAL PLAQUE WITHOUT ASBESTOS: ICD-10-CM

## 2018-10-22 DIAGNOSIS — R06.2 WHEEZING: ICD-10-CM

## 2018-10-22 LAB
ALBUMIN SERPL ELPH-MCNC: 3.1 G/DL — LOW (ref 3.5–5)
ALP SERPL-CCNC: 112 U/L — SIGNIFICANT CHANGE UP (ref 40–120)
ALT FLD-CCNC: 35 U/L DA — SIGNIFICANT CHANGE UP (ref 10–60)
ANION GAP SERPL CALC-SCNC: 6 MMOL/L — SIGNIFICANT CHANGE UP (ref 5–17)
AST SERPL-CCNC: 33 U/L — SIGNIFICANT CHANGE UP (ref 10–40)
BASOPHILS # BLD AUTO: 0.1 K/UL — SIGNIFICANT CHANGE UP (ref 0–0.2)
BASOPHILS NFR BLD AUTO: 1.2 % — SIGNIFICANT CHANGE UP (ref 0–2)
BILIRUB SERPL-MCNC: 0.4 MG/DL — SIGNIFICANT CHANGE UP (ref 0.2–1.2)
BUN SERPL-MCNC: 16 MG/DL — SIGNIFICANT CHANGE UP (ref 7–18)
CALCIUM SERPL-MCNC: 9.4 MG/DL — SIGNIFICANT CHANGE UP (ref 8.4–10.5)
CHLORIDE SERPL-SCNC: 105 MMOL/L — SIGNIFICANT CHANGE UP (ref 96–108)
CO2 SERPL-SCNC: 29 MMOL/L — SIGNIFICANT CHANGE UP (ref 22–31)
CREAT SERPL-MCNC: 0.78 MG/DL — SIGNIFICANT CHANGE UP (ref 0.5–1.3)
EOSINOPHIL # BLD AUTO: 0.4 K/UL — SIGNIFICANT CHANGE UP (ref 0–0.5)
EOSINOPHIL NFR BLD AUTO: 5.2 % — SIGNIFICANT CHANGE UP (ref 0–6)
GLUCOSE BLDC GLUCOMTR-MCNC: 146 MG/DL — HIGH (ref 70–99)
GLUCOSE BLDC GLUCOMTR-MCNC: 287 MG/DL — HIGH (ref 70–99)
GLUCOSE BLDC GLUCOMTR-MCNC: 330 MG/DL — HIGH (ref 70–99)
GLUCOSE BLDC GLUCOMTR-MCNC: 399 MG/DL — HIGH (ref 70–99)
GLUCOSE SERPL-MCNC: 142 MG/DL — HIGH (ref 70–99)
HCT VFR BLD CALC: 43.9 % — SIGNIFICANT CHANGE UP (ref 34.5–45)
HGB BLD-MCNC: 14.4 G/DL — SIGNIFICANT CHANGE UP (ref 11.5–15.5)
LYMPHOCYTES # BLD AUTO: 1.8 K/UL — SIGNIFICANT CHANGE UP (ref 1–3.3)
LYMPHOCYTES # BLD AUTO: 26.4 % — SIGNIFICANT CHANGE UP (ref 13–44)
MAGNESIUM SERPL-MCNC: 2 MG/DL — SIGNIFICANT CHANGE UP (ref 1.6–2.6)
MCHC RBC-ENTMCNC: 30.2 PG — SIGNIFICANT CHANGE UP (ref 27–34)
MCHC RBC-ENTMCNC: 32.7 GM/DL — SIGNIFICANT CHANGE UP (ref 32–36)
MCV RBC AUTO: 92.4 FL — SIGNIFICANT CHANGE UP (ref 80–100)
MONOCYTES # BLD AUTO: 0.8 K/UL — SIGNIFICANT CHANGE UP (ref 0–0.9)
MONOCYTES NFR BLD AUTO: 11.6 % — SIGNIFICANT CHANGE UP (ref 2–14)
NEUTROPHILS # BLD AUTO: 3.8 K/UL — SIGNIFICANT CHANGE UP (ref 1.8–7.4)
NEUTROPHILS NFR BLD AUTO: 55.6 % — SIGNIFICANT CHANGE UP (ref 43–77)
PHOSPHATE SERPL-MCNC: 3.7 MG/DL — SIGNIFICANT CHANGE UP (ref 2.5–4.5)
PLATELET # BLD AUTO: 237 K/UL — SIGNIFICANT CHANGE UP (ref 150–400)
POTASSIUM SERPL-MCNC: 4.7 MMOL/L — SIGNIFICANT CHANGE UP (ref 3.5–5.3)
POTASSIUM SERPL-SCNC: 4.7 MMOL/L — SIGNIFICANT CHANGE UP (ref 3.5–5.3)
PROT SERPL-MCNC: 7.2 G/DL — SIGNIFICANT CHANGE UP (ref 6–8.3)
RBC # BLD: 4.75 M/UL — SIGNIFICANT CHANGE UP (ref 3.8–5.2)
RBC # FLD: 12.2 % — SIGNIFICANT CHANGE UP (ref 10.3–14.5)
SODIUM SERPL-SCNC: 140 MMOL/L — SIGNIFICANT CHANGE UP (ref 135–145)
WBC # BLD: 6.8 K/UL — SIGNIFICANT CHANGE UP (ref 3.8–10.5)
WBC # FLD AUTO: 6.8 K/UL — SIGNIFICANT CHANGE UP (ref 3.8–10.5)

## 2018-10-22 RX ORDER — INSULIN LISPRO 100/ML
4 VIAL (ML) SUBCUTANEOUS ONCE
Qty: 0 | Refills: 0 | Status: COMPLETED | OUTPATIENT
Start: 2018-10-22 | End: 2018-10-22

## 2018-10-22 RX ORDER — INSULIN LISPRO 100/ML
VIAL (ML) SUBCUTANEOUS AT BEDTIME
Qty: 0 | Refills: 0 | Status: DISCONTINUED | OUTPATIENT
Start: 2018-10-22 | End: 2018-10-25

## 2018-10-22 RX ORDER — INSULIN LISPRO 100/ML
VIAL (ML) SUBCUTANEOUS AT BEDTIME
Qty: 0 | Refills: 0 | Status: DISCONTINUED | OUTPATIENT
Start: 2018-10-22 | End: 2018-10-22

## 2018-10-22 RX ADMIN — Medication 7 UNIT(S): at 12:12

## 2018-10-22 RX ADMIN — Medication 4 UNIT(S): at 23:39

## 2018-10-22 RX ADMIN — Medication 3 MILLILITER(S): at 03:31

## 2018-10-22 RX ADMIN — Medication 3: at 12:11

## 2018-10-22 RX ADMIN — ATENOLOL 50 MILLIGRAM(S): 25 TABLET ORAL at 06:25

## 2018-10-22 RX ADMIN — INSULIN GLARGINE 20 UNIT(S): 100 INJECTION, SOLUTION SUBCUTANEOUS at 22:05

## 2018-10-22 RX ADMIN — ENOXAPARIN SODIUM 40 MILLIGRAM(S): 100 INJECTION SUBCUTANEOUS at 12:13

## 2018-10-22 RX ADMIN — Medication 4: at 17:14

## 2018-10-22 RX ADMIN — PREGABALIN 1000 MICROGRAM(S): 225 CAPSULE ORAL at 12:13

## 2018-10-22 RX ADMIN — Medication 3 MILLILITER(S): at 09:34

## 2018-10-22 RX ADMIN — Medication 20 MILLIGRAM(S): at 12:11

## 2018-10-22 RX ADMIN — Medication 7 UNIT(S): at 17:14

## 2018-10-22 RX ADMIN — Medication 3 MILLILITER(S): at 14:42

## 2018-10-22 RX ADMIN — Medication 3 MILLILITER(S): at 20:49

## 2018-10-22 RX ADMIN — SIMVASTATIN 20 MILLIGRAM(S): 20 TABLET, FILM COATED ORAL at 22:05

## 2018-10-22 NOTE — PROGRESS NOTE ADULT - PROBLEM SELECTOR PLAN 1
patient had a fever of 102 last night   Blood Culture: Negative  UA: negative  CXR: no acute path  RVP: positive for enterovirus.

## 2018-10-22 NOTE — PROGRESS NOTE ADULT - SUBJECTIVE AND OBJECTIVE BOX
PGY1 Note discussed with supervising resident and primary attending.    Patient is a 83y old  Female who presents with a chief complaint of Hyperglycemia (22 Oct 2018 11:43)      INTERVAL HPI/OVERNIGHT EVENTS: Pt was wheezing b/l. Pulmonology consult is obtained which showed    MEDICATIONS  (STANDING):  ALBUTerol/ipratropium for Nebulization 3 milliLiter(s) Nebulizer every 6 hours  ATENolol  Tablet 50 milliGRAM(s) Oral daily  cyanocobalamin Injectable 1000 MICROGram(s) IntraMuscular daily  dextrose 5%. 1000 milliLiter(s) (50 mL/Hr) IV Continuous <Continuous>  dextrose 50% Injectable 12.5 Gram(s) IV Push once  dextrose 50% Injectable 25 Gram(s) IV Push once  dextrose 50% Injectable 25 Gram(s) IV Push once  enoxaparin Injectable 40 milliGRAM(s) SubCutaneous daily  insulin glargine Injectable (LANTUS) 20 Unit(s) SubCutaneous at bedtime  insulin lispro (HumaLOG) corrective regimen sliding scale   SubCutaneous three times a day before meals  insulin lispro Injectable (HumaLOG) 7 Unit(s) SubCutaneous three times a day before meals  predniSONE   Tablet 20 milliGRAM(s) Oral daily  simvastatin 20 milliGRAM(s) Oral at bedtime    MEDICATIONS  (PRN):  acetaminophen   Tablet .. 650 milliGRAM(s) Oral every 6 hours PRN Temp greater or equal to 38C (100.4F)  dextrose 40% Gel 15 Gram(s) Oral once PRN Blood Glucose LESS THAN 70 milliGRAM(s)/deciLiter  glucagon  Injectable 1 milliGRAM(s) IntraMuscular once PRN Glucose <70 milliGRAM(s)/deciLiter      Allergies    No Known Allergies    Intolerances        REVIEW OF SYSTEMS:  CONSTITUTIONAL: No fever, weight loss, or fatigue  RESPIRATORY: No cough, wheezing, chills or hemoptysis; No shortness of breath  CARDIOVASCULAR: No chest pain, palpitations, dizziness, or leg swelling  GASTROINTESTINAL: No abdominal or epigastric pain. No nausea, vomiting, or hematemesis; No diarrhea or constipation. No melena or hematochezia.  NEUROLOGICAL: No headaches, memory loss, loss of strength, numbness, or tremors  SKIN: No itching, burning, rashes, or lesions     Vital Signs Last 24 Hrs  T(C): 36.7 (22 Oct 2018 13:52), Max: 37.3 (21 Oct 2018 22:20)  T(F): 98.1 (22 Oct 2018 13:52), Max: 99.2 (21 Oct 2018 22:20)  HR: 72 (22 Oct 2018 13:52) (72 - 83)  BP: 105/54 (22 Oct 2018 13:52) (105/52 - 117/63)  BP(mean): --  RR: 16 (22 Oct 2018 13:52) (16 - 17)  SpO2: 92% (22 Oct 2018 13:52) (92% - 98%)    PHYSICAL EXAM:  GENERAL: NAD, well-groomed, well-developed  HEAD:  Atraumatic, Normocephalic  EYES: EOMI, PERRLA, conjunctiva and sclera clear  NECK: Supple, No JVD, Normal thyroid  CHEST/LUNG: Clear to percussion bilaterally; No rales, rhonchi, wheezing, or rubs  HEART: Regular rate and rhythm; No murmurs, rubs, or gallops  ABDOMEN: Soft, Nontender, Nondistended; Bowel sounds present  NERVOUS SYSTEM:  Alert & Oriented X3, Good concentration; Motor Strength 5/5 B/L   EXTREMITIES:  2+ Peripheral Pulses, No clubbing, cyanosis, or edema  SKIN;    LABS:                        14.4   6.8   )-----------( 237      ( 22 Oct 2018 05:55 )             43.9     10-22    140  |  105  |  16  ----------------------------<  142<H>  4.7   |  29  |  0.78    Ca    9.4      22 Oct 2018 05:55  Phos  3.7     10-22  Mg     2.0     10-22    TPro  7.2  /  Alb  3.1<L>  /  TBili  0.4  /  DBili  x   /  AST  33  /  ALT  35  /  AlkPhos  112  10-22        CAPILLARY BLOOD GLUCOSE      POCT Blood Glucose.: 287 mg/dL (22 Oct 2018 11:39)  POCT Blood Glucose.: 146 mg/dL (22 Oct 2018 07:58)  POCT Blood Glucose.: 232 mg/dL (21 Oct 2018 21:47)  POCT Blood Glucose.: 78 mg/dL (21 Oct 2018 16:44)      RADIOLOGY & ADDITIONAL TESTS:    Imaging Personally Reviewed:  [ ] YES  [ ] NO    Consultant(s) Notes Reviewed:  [ ] YES  [ ] NO PGY1 Note discussed with supervising resident and primary attending.    Patient is a 83y old  Female who presents with a chief complaint of Hyperglycemia (22 Oct 2018 11:43)      INTERVAL HPI/OVERNIGHT EVENTS: Pt was wheezing b/l. Pulmonology consult is obtained who recommended outpatient PFT's. Also recommended TB QuantiFeron test.     MEDICATIONS  (STANDING):  ALBUTerol/ipratropium for Nebulization 3 milliLiter(s) Nebulizer every 6 hours  ATENolol  Tablet 50 milliGRAM(s) Oral daily  cyanocobalamin Injectable 1000 MICROGram(s) IntraMuscular daily  dextrose 5%. 1000 milliLiter(s) (50 mL/Hr) IV Continuous <Continuous>  dextrose 50% Injectable 12.5 Gram(s) IV Push once  dextrose 50% Injectable 25 Gram(s) IV Push once  dextrose 50% Injectable 25 Gram(s) IV Push once  enoxaparin Injectable 40 milliGRAM(s) SubCutaneous daily  insulin glargine Injectable (LANTUS) 20 Unit(s) SubCutaneous at bedtime  insulin lispro (HumaLOG) corrective regimen sliding scale   SubCutaneous three times a day before meals  insulin lispro Injectable (HumaLOG) 7 Unit(s) SubCutaneous three times a day before meals  predniSONE   Tablet 20 milliGRAM(s) Oral daily  simvastatin 20 milliGRAM(s) Oral at bedtime    MEDICATIONS  (PRN):  acetaminophen   Tablet .. 650 milliGRAM(s) Oral every 6 hours PRN Temp greater or equal to 38C (100.4F)  dextrose 40% Gel 15 Gram(s) Oral once PRN Blood Glucose LESS THAN 70 milliGRAM(s)/deciLiter  glucagon  Injectable 1 milliGRAM(s) IntraMuscular once PRN Glucose <70 milliGRAM(s)/deciLiter      Allergies    No Known Allergies    Intolerances        REVIEW OF SYSTEMS:  CONSTITUTIONAL: No fever, weight loss, or fatigue  RESPIRATORY: No cough, wheezing, chills or hemoptysis; No shortness of breath  CARDIOVASCULAR: No chest pain, palpitations, dizziness, or leg swelling  GASTROINTESTINAL: No abdominal or epigastric pain. No nausea, vomiting, or hematemesis; No diarrhea or constipation. No melena or hematochezia.  NEUROLOGICAL: No headaches, memory loss, loss of strength, numbness, or tremors  SKIN: No itching, burning, rashes, or lesions     Vital Signs Last 24 Hrs  T(C): 36.7 (22 Oct 2018 13:52), Max: 37.3 (21 Oct 2018 22:20)  T(F): 98.1 (22 Oct 2018 13:52), Max: 99.2 (21 Oct 2018 22:20)  HR: 72 (22 Oct 2018 13:52) (72 - 83)  BP: 105/54 (22 Oct 2018 13:52) (105/52 - 117/63)  BP(mean): --  RR: 16 (22 Oct 2018 13:52) (16 - 17)  SpO2: 92% (22 Oct 2018 13:52) (92% - 98%)    PHYSICAL EXAM:  GENERAL: NAD, vision impaired   HEENT: Normocephalic;  conjunctivae and sclerae clear; moist mucous membranes;   NECK : supple  CHEST/LUNG: Clear to auscultation bilaterally with good air entry   HEART: S1 S2  regular; no murmurs, gallops or rubs  ABDOMEN: Soft, Nontender, Nondistended; Bowel sounds present  EXTREMITIES: no cyanosis; no edema; no calf tenderness  SKIN: warm and dry; no rash  NERVOUS SYSTEM:  Awake and alert; Oriented  to place, person and time ; no new deficits        LABS:                        14.4   6.8   )-----------( 237      ( 22 Oct 2018 05:55 )             43.9     10-22    140  |  105  |  16  ----------------------------<  142<H>  4.7   |  29  |  0.78    Ca    9.4      22 Oct 2018 05:55  Phos  3.7     10-22  Mg     2.0     10-22    TPro  7.2  /  Alb  3.1<L>  /  TBili  0.4  /  DBili  x   /  AST  33  /  ALT  35  /  AlkPhos  112  10-22        CAPILLARY BLOOD GLUCOSE      POCT Blood Glucose.: 287 mg/dL (22 Oct 2018 11:39)  POCT Blood Glucose.: 146 mg/dL (22 Oct 2018 07:58)  POCT Blood Glucose.: 232 mg/dL (21 Oct 2018 21:47)  POCT Blood Glucose.: 78 mg/dL (21 Oct 2018 16:44)      Consultant(s) Notes Reviewed:  [x] YES  [ ] NO

## 2018-10-22 NOTE — CHART NOTE - NSCHARTNOTEFT_GEN_A_CORE
Reassessment: Nutrition consult for assessment & education  83yFemalePatient is a 83y old  Female who presents with a chief complaint of Hyperglycemia (22 Oct 2018 09:50)      Factors impacting intake: [ ] none [ ] nausea  [ ] vomiting [ ] diarrhea [ ] constipation  [ ]chewing problems [ ] swallowing issues  [ X] other:     Diet Presciption: Diet, Consistent Carbohydrate w/Evening Snack (10-14-18 @ 21:21)    Intake: Pt. seen by RD & initial assessment done on 10/17/18 noted. Visited pt. alert but confused, d/w RN, intake >50%, contacted daughter - Chantal Fritz at via phone# [phone], give education on My [employer] with carbohydrate counting, reinforced, answer questions & concerns, agreed to take copies of handouts in [place], placed at pt. bedside when visiting pt. today in the afternoon, labs-blood glucose WNL noted, d/w RN/MD, c/w consistent carbohydrate w/evening snack, as medically feasible. RD available.     Daily Weight in k (22 Oct 2018 05:35)  Weight in k.6 (20 Oct 2018 05:26)  Weight in k.9 (19 Oct 2018 05:38)  Weight in k.9 (18 Oct 2018 05:41)  Weight in k.8 (17 Oct 2018 14:10)  Weight in k (17 Oct 2018 05:48)    % Weight Change; Stable     Pertinent Medications: MEDICATIONS  (STANDING):  ALBUTerol/ipratropium for Nebulization 3 milliLiter(s) Nebulizer every 6 hours  ATENolol  Tablet 50 milliGRAM(s) Oral daily  cyanocobalamin Injectable 1000 MICROGram(s) IntraMuscular daily  dextrose 5%. 1000 milliLiter(s) (50 mL/Hr) IV Continuous <Continuous>  dextrose 50% Injectable 12.5 Gram(s) IV Push once  dextrose 50% Injectable 25 Gram(s) IV Push once  dextrose 50% Injectable 25 Gram(s) IV Push once  enoxaparin Injectable 40 milliGRAM(s) SubCutaneous daily  insulin glargine Injectable (LANTUS) 20 Unit(s) SubCutaneous at bedtime  insulin lispro (HumaLOG) corrective regimen sliding scale   SubCutaneous three times a day before meals  insulin lispro Injectable (HumaLOG) 7 Unit(s) SubCutaneous three times a day before meals  predniSONE   Tablet 20 milliGRAM(s) Oral daily  simvastatin 20 milliGRAM(s) Oral at bedtime    MEDICATIONS  (PRN):  acetaminophen   Tablet .. 650 milliGRAM(s) Oral every 6 hours PRN Temp greater or equal to 38C (100.4F)  dextrose 40% Gel 15 Gram(s) Oral once PRN Blood Glucose LESS THAN 70 milliGRAM(s)/deciLiter  glucagon  Injectable 1 milliGRAM(s) IntraMuscular once PRN Glucose <70 milliGRAM(s)/deciLiter    Pertinent Labs: 10-22 Na140 mmol/L Glu 142 mg/dL<H> K+ 4.7 mmol/L Cr  0.78 mg/dL BUN 16 mg/dL 10-22 Phos 3.7 mg/dL 10-22 Alb 3.1 g/dL<L> 10-15 [id number] 15.0 %<H> 10-15 Chol 178 mg/dL  mg/dL HDL 36 mg/dL<L> Trig 138 mg/dL     CAPILLARY BLOOD GLUCOSE      POCT Blood Glucose.: 146 mg/dL (22 Oct 2018 07:58)  POCT Blood Glucose.: 232 mg/dL (21 Oct 2018 21:47)  POCT Blood Glucose.: 78 mg/dL (21 Oct 2018 16:44)    Skin: Intact    Estimated Needs:   [x ] no change since previous assessment  [ ] recalculated:     Previous Nutrition Diagnosis:   [ ] Inadequate Energy Intake [ ]Inadequate Oral Intake [ ] Excessive Energy Intake   [ ] Underweight [ ] Increased Nutrient Needs [ ] Overweight/Obesity   [X ] Altered Lab values [ ] Unintended Weight Loss [ ] Food & Nutrition Related Knowledge Deficit [ ] Malnutrition     Nutrition Diagnosis is [X] ongoing  [ ] resolved [ ] not applicable     New Nutrition Diagnosis: [ ] not applicable       Interventions: To meet nutrition needs   Recommend  [ ] Change Diet To:  [ ] Nutrition Supplement  [ ] Nutrition Support  [ ] Other:     Monitoring and Evaluation:   [ x PO intake [ x ] Tolerance to diet prescription [ x ] weights [ x ] labs[ x ] follow up per protocol  [ ] other:

## 2018-10-22 NOTE — PROGRESS NOTE ADULT - SUBJECTIVE AND OBJECTIVE BOX
Patient is a 83y old  Female who presents with a chief complaint of Hyperglycemia (21 Oct 2018 16:17)    pt seen in ed [  ], reg med floor [  x ], bed [  ], chair at bedside [ x  ], a+o x3 [ x ], lethargic [  ],  nad [ x ]      Allergies    No Known Allergies        Vitals    T(F): 99 (10-22-18 @ 05:35), Max: 99.2 (10-21-18 @ 22:20)  HR: 79 (10-22-18 @ 05:35) (72 - 83)  BP: 117/63 (10-22-18 @ 05:35) (99/59 - 117/63)  RR: 17 (10-22-18 @ 05:35) (17 - 18)  SpO2: 98% (10-22-18 @ 05:35) (90% - 98%)  Wt(kg): --  CAPILLARY BLOOD GLUCOSE      POCT Blood Glucose.: 146 mg/dL (22 Oct 2018 07:58)      Labs                          14.4   6.8   )-----------( 237      ( 22 Oct 2018 05:55 )             43.9       10-22    140  |  105  |  16  ----------------------------<  142<H>  4.7   |  29  |  0.78    Ca    9.4      22 Oct 2018 05:55  Phos  3.7     10-22  Mg     2.0     10-22    TPro  7.2  /  Alb  3.1<L>  /  TBili  0.4  /  DBili  x   /  AST  33  /  ALT  35  /  AlkPhos  112  10-22            .Blood Blood-Peripheral  10-19 @ 14:19   No growth to date.  --  --      .Urine Clean Catch (Midstream)  10-14 @ 21:59   No growth  --  --          Radiology Results      Meds    MEDICATIONS  (STANDING):  ALBUTerol/ipratropium for Nebulization 3 milliLiter(s) Nebulizer every 6 hours  ATENolol  Tablet 50 milliGRAM(s) Oral daily  cyanocobalamin Injectable 1000 MICROGram(s) IntraMuscular daily  dextrose 5%. 1000 milliLiter(s) (50 mL/Hr) IV Continuous <Continuous>  dextrose 50% Injectable 12.5 Gram(s) IV Push once  dextrose 50% Injectable 25 Gram(s) IV Push once  dextrose 50% Injectable 25 Gram(s) IV Push once  enoxaparin Injectable 40 milliGRAM(s) SubCutaneous daily  insulin glargine Injectable (LANTUS) 20 Unit(s) SubCutaneous at bedtime  insulin lispro (HumaLOG) corrective regimen sliding scale   SubCutaneous three times a day before meals  insulin lispro Injectable (HumaLOG) 7 Unit(s) SubCutaneous three times a day before meals  simvastatin 20 milliGRAM(s) Oral at bedtime      MEDICATIONS  (PRN):  acetaminophen   Tablet .. 650 milliGRAM(s) Oral every 6 hours PRN Temp greater or equal to 38C (100.4F)  dextrose 40% Gel 15 Gram(s) Oral once PRN Blood Glucose LESS THAN 70 milliGRAM(s)/deciLiter  glucagon  Injectable 1 milliGRAM(s) IntraMuscular once PRN Glucose <70 milliGRAM(s)/deciLiter      Physical Exam    Neuro :  no focal deficits  Respiratory: wheezing  B/L  CV: RRR, S1S2, no murmurs,   Abdominal: Soft, NT, ND +BS,  Extremities: No edema, + peripheral pulses    ASSESSMENT      fever 2nd to entero/rhino virus  uncontrolled dm  h/o DM type 2 (diabetes mellitus, type 2)  No significant past surgical history      PLAN      blood cx javy noted above  ucx neg noted above   cxr neg for acute patho noted above  rapid rvp with entero/rhino virus noted above  cont antipyretics prn  cont duonebs q6  pulm cons  endo f/u   cont Lantus 20 units qhs   cont humalog 7 units premeals  Hba1C, 15 noted   serum glucose improving  phys tx eval noted and rec rolling walker and home phys tx  cont current meds

## 2018-10-22 NOTE — CONSULT NOTE ADULT - SUBJECTIVE AND OBJECTIVE BOX
PULMONARY CONSULT NOTE      SANTOS SORIANO  MRN-[id number]    Patient is a 83y old  Female who presents with a chief complaint of Hyperglycemia (22 Oct 2018 09:50)    History of Present Illness:  Reason for Admission: Hyperglycemia	  History of Present Illness: 	  Pt is an 84 y/o F, from home, with a PMHx of DM type II (oral medication no insulin) who presents to the ED with complaints of elevated blood glucose at home.  Pt is a poor historian and history obtained over the phone from daughter, Chantal, who notes blood glucose was elevated yesterday in the 300's.  Pt continued to take her PO glipized as scheduled.  This AM her glucose was 340 before breakfast.  Pt also complained of urinary urgency and dysuria.  Daughter also notes pt has been drinking a lot of water lately.  Pt denies HA, blurred vision, diaphoresis, weakness, loss of apetite, lethargy/ confusion, or dizziness.  Pt last saw her PCP, Dr. Zarate, 1 mo prior and blood sugars were fine at that time.  Pt was switched from metformin to Glipizide 6mo prior, and sugars have been well controlled.  Pt is compliant with medication.  Pt's diet is poor.          HISTORY OF PRESENT ILLNESS: As above. Awake, alert, comfortable in bed in NAD    MEDICATIONS  (STANDING):  ALBUTerol/ipratropium for Nebulization 3 milliLiter(s) Nebulizer every 6 hours  ATENolol  Tablet 50 milliGRAM(s) Oral daily  cyanocobalamin Injectable 1000 MICROGram(s) IntraMuscular daily  dextrose 5%. 1000 milliLiter(s) (50 mL/Hr) IV Continuous <Continuous>  dextrose 50% Injectable 12.5 Gram(s) IV Push once  dextrose 50% Injectable 25 Gram(s) IV Push once  dextrose 50% Injectable 25 Gram(s) IV Push once  enoxaparin Injectable 40 milliGRAM(s) SubCutaneous daily  insulin glargine Injectable (LANTUS) 20 Unit(s) SubCutaneous at bedtime  insulin lispro (HumaLOG) corrective regimen sliding scale   SubCutaneous three times a day before meals  insulin lispro Injectable (HumaLOG) 7 Unit(s) SubCutaneous three times a day before meals  predniSONE   Tablet 20 milliGRAM(s) Oral daily  simvastatin 20 milliGRAM(s) Oral at bedtime      MEDICATIONS  (PRN):  acetaminophen   Tablet .. 650 milliGRAM(s) Oral every 6 hours PRN Temp greater or equal to 38C (100.4F)  dextrose 40% Gel 15 Gram(s) Oral once PRN Blood Glucose LESS THAN 70 milliGRAM(s)/deciLiter  glucagon  Injectable 1 milliGRAM(s) IntraMuscular once PRN Glucose <70 milliGRAM(s)/deciLiter      Allergies    No Known Allergies    Intolerances        PAST MEDICAL & SURGICAL HISTORY:  DM type 2 (diabetes mellitus, type 2)  No significant past surgical history      FAMILY HISTORY:  Family history of diabetes mellitus (Sibling)      SOCIAL HISTORY  Smoking History:     REVIEW OF SYSTEMS:    CONSTITUTIONAL:  No fevers, chills, sweats    HEENT:  Eyes:  No diplopia or blurred vision. ENT:  No earache, sore throat or runny nose.    CARDIOVASCULAR:  No pressure, squeezing, tightness, or heaviness about the chest; no palpitations.    RESPIRATORY:  Per HPI    GASTROINTESTINAL:  No abdominal pain, nausea, vomiting or diarrhea.    GENITOURINARY:  No dysuria, frequency or urgency.    NEUROLOGIC:  No paresthesias, fasciculations, seizures or weakness.    PSYCHIATRIC:  No disorder of thought or mood.    Vital Signs Last 24 Hrs  T(C): 37.2 (22 Oct 2018 05:35), Max: 37.3 (21 Oct 2018 22:20)  T(F): 99 (22 Oct 2018 05:35), Max: 99.2 (21 Oct 2018 22:20)  HR: 79 (22 Oct 2018 05:35) (72 - 83)  BP: 117/63 (22 Oct 2018 05:35) (99/59 - 117/63)  BP(mean): --  RR: 17 (22 Oct 2018 05:35) (17 - 18)  SpO2: 98% (22 Oct 2018 05:35) (90% - 98%)  I&O's Detail      PHYSICAL EXAMINATION:    GENERAL: The patient is a well-developed, well-nourished _____in no apparent distress.     HEENT: Head is normocephalic and atraumatic. Extraocular muscles are intact. Mucous membranes are moist.     NECK: Supple.     LUNGS: Few scattered wheezes bilaterally    HEART: Regular rate and rhythm without murmur.    ABDOMEN: Soft, nontender, and nondistended.  No hepatosplenomegaly is noted.    EXTREMITIES: Without any cyanosis, clubbing, rash, lesions or edema.    NEUROLOGIC: Grossly intact.      LABS:                        14.4   6.8   )-----------( 237      ( 22 Oct 2018 05:55 )             43.9     10-22    140  |  105  |  16  ----------------------------<  142<H>  4.7   |  29  |  0.78    Ca    9.4      22 Oct 2018 05:55  Phos  3.7     10-22  Mg     2.0     10-22    TPro  7.2  /  Alb  3.1<L>  /  TBili  0.4  /  DBili  x   /  AST  33  /  ALT  35  /  AlkPhos  112  10-22                        MICROBIOLOGY:    RADIOLOGY & ADDITIONAL STUDIES:    CXR:  < from: Xray Chest 1 View-PORTABLE IMMEDIATE (10.19.18 @ 11:53) >  FINDINGS/  IMPRESSION:  Left apical pleural thickening without significant change.    No consolidation or pleural effusion.    Heart size cannot be accurately assessed in this projection.    < end of copied text >    Ct scan chest:    ekg;    echo:

## 2018-10-23 LAB
ANION GAP SERPL CALC-SCNC: 7 MMOL/L — SIGNIFICANT CHANGE UP (ref 5–17)
BUN SERPL-MCNC: 14 MG/DL — SIGNIFICANT CHANGE UP (ref 7–18)
CALCIUM SERPL-MCNC: 9.2 MG/DL — SIGNIFICANT CHANGE UP (ref 8.4–10.5)
CHLORIDE SERPL-SCNC: 105 MMOL/L — SIGNIFICANT CHANGE UP (ref 96–108)
CO2 SERPL-SCNC: 26 MMOL/L — SIGNIFICANT CHANGE UP (ref 22–31)
CREAT SERPL-MCNC: 0.85 MG/DL — SIGNIFICANT CHANGE UP (ref 0.5–1.3)
GLUCOSE BLDC GLUCOMTR-MCNC: 194 MG/DL — HIGH (ref 70–99)
GLUCOSE BLDC GLUCOMTR-MCNC: 265 MG/DL — HIGH (ref 70–99)
GLUCOSE BLDC GLUCOMTR-MCNC: 292 MG/DL — HIGH (ref 70–99)
GLUCOSE BLDC GLUCOMTR-MCNC: 384 MG/DL — HIGH (ref 70–99)
GLUCOSE SERPL-MCNC: 209 MG/DL — HIGH (ref 70–99)
HCT VFR BLD CALC: 41.9 % — SIGNIFICANT CHANGE UP (ref 34.5–45)
HGB BLD-MCNC: 14 G/DL — SIGNIFICANT CHANGE UP (ref 11.5–15.5)
MCHC RBC-ENTMCNC: 30.7 PG — SIGNIFICANT CHANGE UP (ref 27–34)
MCHC RBC-ENTMCNC: 33.5 GM/DL — SIGNIFICANT CHANGE UP (ref 32–36)
MCV RBC AUTO: 91.8 FL — SIGNIFICANT CHANGE UP (ref 80–100)
PLATELET # BLD AUTO: 243 K/UL — SIGNIFICANT CHANGE UP (ref 150–400)
POTASSIUM SERPL-MCNC: 4 MMOL/L — SIGNIFICANT CHANGE UP (ref 3.5–5.3)
POTASSIUM SERPL-SCNC: 4 MMOL/L — SIGNIFICANT CHANGE UP (ref 3.5–5.3)
RBC # BLD: 4.56 M/UL — SIGNIFICANT CHANGE UP (ref 3.8–5.2)
RBC # FLD: 12 % — SIGNIFICANT CHANGE UP (ref 10.3–14.5)
SODIUM SERPL-SCNC: 138 MMOL/L — SIGNIFICANT CHANGE UP (ref 135–145)
WBC # BLD: 8.2 K/UL — SIGNIFICANT CHANGE UP (ref 3.8–10.5)
WBC # FLD AUTO: 8.2 K/UL — SIGNIFICANT CHANGE UP (ref 3.8–10.5)

## 2018-10-23 RX ORDER — INSULIN GLARGINE 100 [IU]/ML
24 INJECTION, SOLUTION SUBCUTANEOUS AT BEDTIME
Qty: 0 | Refills: 0 | Status: DISCONTINUED | OUTPATIENT
Start: 2018-10-23 | End: 2018-10-24

## 2018-10-23 RX ADMIN — Medication 3 MILLILITER(S): at 20:57

## 2018-10-23 RX ADMIN — Medication 20 MILLIGRAM(S): at 05:54

## 2018-10-23 RX ADMIN — Medication 3 MILLILITER(S): at 02:57

## 2018-10-23 RX ADMIN — Medication 3 MILLILITER(S): at 14:40

## 2018-10-23 RX ADMIN — ENOXAPARIN SODIUM 40 MILLIGRAM(S): 100 INJECTION SUBCUTANEOUS at 12:43

## 2018-10-23 RX ADMIN — Medication 1: at 21:46

## 2018-10-23 RX ADMIN — PREGABALIN 1000 MICROGRAM(S): 225 CAPSULE ORAL at 12:40

## 2018-10-23 RX ADMIN — SIMVASTATIN 20 MILLIGRAM(S): 20 TABLET, FILM COATED ORAL at 21:47

## 2018-10-23 RX ADMIN — Medication 3 MILLILITER(S): at 09:41

## 2018-10-23 RX ADMIN — Medication 7 UNIT(S): at 17:44

## 2018-10-23 RX ADMIN — INSULIN GLARGINE 24 UNIT(S): 100 INJECTION, SOLUTION SUBCUTANEOUS at 21:46

## 2018-10-23 RX ADMIN — Medication 7 UNIT(S): at 12:41

## 2018-10-23 RX ADMIN — Medication 7 UNIT(S): at 08:35

## 2018-10-23 RX ADMIN — Medication 1: at 08:34

## 2018-10-23 RX ADMIN — Medication 3: at 17:44

## 2018-10-23 RX ADMIN — Medication 5: at 12:36

## 2018-10-23 NOTE — PROGRESS NOTE ADULT - SUBJECTIVE AND OBJECTIVE BOX
Patient is a 83y old  Female who presents with a chief complaint of Hyperglycemia (22 Oct 2018 14:40)    pt seen in ed [  ], reg med floor [  x ], bed [  ], chair at bedside [ x  ], a+o x3 [ x ], lethargic [  ],  nad [ x ]      Allergies    No Known Allergies        Vitals    T(F): 97.8 (10-23-18 @ 05:35), Max: 98.1 (10-22-18 @ 13:52)  HR: 87 (10-23-18 @ 05:35) (72 - 93)  BP: 105/47 (10-23-18 @ 05:35) (105/47 - 105/67)  RR: 17 (10-23-18 @ 05:35) (16 - 17)  SpO2: 94% (10-23-18 @ 05:35) (92% - 94%)  Wt(kg): --  CAPILLARY BLOOD GLUCOSE      POCT Blood Glucose.: 194 mg/dL (23 Oct 2018 08:16)      Labs                          14.0   8.2   )-----------( 243      ( 23 Oct 2018 06:06 )             41.9       10-23    138  |  105  |  14  ----------------------------<  209<H>  4.0   |  26  |  0.85    Ca    9.2      23 Oct 2018 06:06  Phos  3.7     10-22  Mg     2.0     10-22    TPro  7.2  /  Alb  3.1<L>  /  TBili  0.4  /  DBili  x   /  AST  33  /  ALT  35  /  AlkPhos  112  10-22            .Blood Blood-Peripheral  10-19 @ 14:19   No growth to date.  --  --      .Urine Clean Catch (Midstream)  10-14 @ 21:59   No growth  --  --          Radiology Results      Meds    MEDICATIONS  (STANDING):  ALBUTerol/ipratropium for Nebulization 3 milliLiter(s) Nebulizer every 6 hours  ATENolol  Tablet 50 milliGRAM(s) Oral daily  cyanocobalamin Injectable 1000 MICROGram(s) IntraMuscular daily  dextrose 5%. 1000 milliLiter(s) (50 mL/Hr) IV Continuous <Continuous>  dextrose 50% Injectable 12.5 Gram(s) IV Push once  dextrose 50% Injectable 25 Gram(s) IV Push once  dextrose 50% Injectable 25 Gram(s) IV Push once  enoxaparin Injectable 40 milliGRAM(s) SubCutaneous daily  insulin glargine Injectable (LANTUS) 24 Unit(s) SubCutaneous at bedtime  insulin lispro (HumaLOG) corrective regimen sliding scale   SubCutaneous at bedtime  insulin lispro (HumaLOG) corrective regimen sliding scale   SubCutaneous three times a day before meals  insulin lispro Injectable (HumaLOG) 7 Unit(s) SubCutaneous three times a day before meals  predniSONE   Tablet 20 milliGRAM(s) Oral daily  simvastatin 20 milliGRAM(s) Oral at bedtime      MEDICATIONS  (PRN):  acetaminophen   Tablet .. 650 milliGRAM(s) Oral every 6 hours PRN Temp greater or equal to 38C (100.4F)  dextrose 40% Gel 15 Gram(s) Oral once PRN Blood Glucose LESS THAN 70 milliGRAM(s)/deciLiter  glucagon  Injectable 1 milliGRAM(s) IntraMuscular once PRN Glucose <70 milliGRAM(s)/deciLiter      Physical Exam    Neuro :  no focal deficits  Respiratory: wheezing  B/L  CV: RRR, S1S2, no murmurs,   Abdominal: Soft, NT, ND +BS,  Extremities: No edema, + peripheral pulses    ASSESSMENT      fever 2nd to entero/rhino virus  uncontrolled dm  h/o DM type 2 (diabetes mellitus, type 2)  No significant past surgical history      PLAN      blood cx javy noted above  ucx neg noted above   cxr neg for acute patho noted above  rapid rvp with entero/rhino virus noted above  cont antipyretics prn  cont duonebs q6  pulm f/u  pt still coughing  add levaquin 500mg q12  cont short course steroids  endo f/u   cont Lantus 24 units qhs   cont humalog 7 units premeals  Hba1C, 15 noted   serum glucose improving  phys tx eval noted and rec rolling walker and home phys tx  cont current meds

## 2018-10-23 NOTE — PROGRESS NOTE ADULT - SUBJECTIVE AND OBJECTIVE BOX
Patient is a 83y old  Female who presents with a chief complaint of Hyperglycemia (23 Oct 2018 09:50)     Awake, alert, comfortable in bed in NAD. With wheezing b/l .      INTERVAL HPI/OVERNIGHT EVENTS:      VITAL SIGNS:  T(F): 97.8 (10-23-18 @ 05:35)  HR: 87 (10-23-18 @ 05:35)  BP: 105/47 (10-23-18 @ 05:35)  RR: 17 (10-23-18 @ 05:35)  SpO2: 94% (10-23-18 @ 05:35)  Wt(kg): --  I&O's Detail          REVIEW OF SYSTEMS:    CONSTITUTIONAL:  No fevers, chills, sweats    HEENT:  Eyes:  No diplopia or blurred vision. ENT:  No earache, sore throat or runny nose.    CARDIOVASCULAR:  No pressure, squeezing, tightness, or heaviness about the chest; no palpitations.    RESPIRATORY:  Per HPI    GASTROINTESTINAL:  No abdominal pain, nausea, vomiting or diarrhea.    GENITOURINARY:  No dysuria, frequency or urgency.    NEUROLOGIC:  No paresthesias, fasciculations, seizures or weakness.    PSYCHIATRIC:  No disorder of thought or mood.      PHYSICAL EXAM:    Constitutional: Well developed and nourished  Eyes:Perrla  ENMT: normal  Neck:supple  Respiratory: + Few scattered wheezes bilaterally  Cardiovascular: S1 S2 regular  Gastrointestinal: Soft, Non tender  Extremities: No edema  Vascular:normal  Neurological:Awake, alert,Ox3  Musculoskeletal:Normal      MEDICATIONS  (STANDING):  ALBUTerol/ipratropium for Nebulization 3 milliLiter(s) Nebulizer every 6 hours  ATENolol  Tablet 50 milliGRAM(s) Oral daily  cyanocobalamin Injectable 1000 MICROGram(s) IntraMuscular daily  dextrose 5%. 1000 milliLiter(s) (50 mL/Hr) IV Continuous <Continuous>  dextrose 50% Injectable 12.5 Gram(s) IV Push once  dextrose 50% Injectable 25 Gram(s) IV Push once  dextrose 50% Injectable 25 Gram(s) IV Push once  enoxaparin Injectable 40 milliGRAM(s) SubCutaneous daily  insulin glargine Injectable (LANTUS) 24 Unit(s) SubCutaneous at bedtime  insulin lispro (HumaLOG) corrective regimen sliding scale   SubCutaneous at bedtime  insulin lispro (HumaLOG) corrective regimen sliding scale   SubCutaneous three times a day before meals  insulin lispro Injectable (HumaLOG) 7 Unit(s) SubCutaneous three times a day before meals  levoFLOXacin  Tablet 500 milliGRAM(s) Oral every 24 hours  predniSONE   Tablet 20 milliGRAM(s) Oral daily  simvastatin 20 milliGRAM(s) Oral at bedtime    MEDICATIONS  (PRN):  acetaminophen   Tablet .. 650 milliGRAM(s) Oral every 6 hours PRN Temp greater or equal to 38C (100.4F)  dextrose 40% Gel 15 Gram(s) Oral once PRN Blood Glucose LESS THAN 70 milliGRAM(s)/deciLiter  glucagon  Injectable 1 milliGRAM(s) IntraMuscular once PRN Glucose <70 milliGRAM(s)/deciLiter      Allergies    No Known Allergies    Intolerances        LABS:                        14.0   8.2   )-----------( 243      ( 23 Oct 2018 06:06 )             41.9     10-23    138  |  105  |  14  ----------------------------<  209<H>  4.0   |  26  |  0.85    Ca    9.2      23 Oct 2018 06:06  Phos  3.7     10-22  Mg     2.0     10-22    TPro  7.2  /  Alb  3.1<L>  /  TBili  0.4  /  DBili  x   /  AST  33  /  ALT  35  /  AlkPhos  112  10-22              CAPILLARY BLOOD GLUCOSE      POCT Blood Glucose.: 194 mg/dL (23 Oct 2018 08:16)  POCT Blood Glucose.: 399 mg/dL (22 Oct 2018 21:14)  POCT Blood Glucose.: 330 mg/dL (22 Oct 2018 16:51)  POCT Blood Glucose.: 287 mg/dL (22 Oct 2018 11:39)        RADIOLOGY & ADDITIONAL TESTS:  CXR:  < from: Xray Chest 1 View-PORTABLE IMMEDIATE (10.19.18 @ 11:53) >  FINDINGS/  IMPRESSION:  Left apical pleural thickening without significant change.    No consolidation or pleural effusion.    Heart size cannot be accurately assessed in this projection.    < end of copied text >    Ct scan chest:    ekg;    echo: Patient is a 83y old  Female who presents with a chief complaint of Hyperglycemia (23 Oct 2018 09:50)     Awake, alert, comfortable out of bed in NAD. With wheezing b/l. No sob at rest. Former smoker      INTERVAL HPI/OVERNIGHT EVENTS:      VITAL SIGNS:  T(F): 97.8 (10-23-18 @ 05:35)  HR: 87 (10-23-18 @ 05:35)  BP: 105/47 (10-23-18 @ 05:35)  RR: 17 (10-23-18 @ 05:35)  SpO2: 94% (10-23-18 @ 05:35)  Wt(kg): --  I&O's Detail          REVIEW OF SYSTEMS:    CONSTITUTIONAL:  No fevers, chills, sweats    HEENT:  Eyes:  No diplopia or blurred vision. ENT:  No earache, sore throat or runny nose.    CARDIOVASCULAR:  No pressure, squeezing, tightness, or heaviness about the chest; no palpitations.    RESPIRATORY:  Per HPI    GASTROINTESTINAL:  No abdominal pain, nausea, vomiting or diarrhea.    GENITOURINARY:  No dysuria, frequency or urgency.    NEUROLOGIC:  No paresthesias, fasciculations, seizures or weakness.    PSYCHIATRIC:  No disorder of thought or mood.      PHYSICAL EXAM:    Constitutional: Well developed and nourished  Eyes:Perrla  ENMT: normal  Neck:supple  Respiratory: + Few scattered wheezes bilaterally  Cardiovascular: S1 S2 regular  Gastrointestinal: Soft, Non tender  Extremities: No edema  Vascular:normal  Neurological:Awake, alert,Ox3  Musculoskeletal:Normal      MEDICATIONS  (STANDING):  ALBUTerol/ipratropium for Nebulization 3 milliLiter(s) Nebulizer every 6 hours  ATENolol  Tablet 50 milliGRAM(s) Oral daily  cyanocobalamin Injectable 1000 MICROGram(s) IntraMuscular daily  dextrose 5%. 1000 milliLiter(s) (50 mL/Hr) IV Continuous <Continuous>  dextrose 50% Injectable 12.5 Gram(s) IV Push once  dextrose 50% Injectable 25 Gram(s) IV Push once  dextrose 50% Injectable 25 Gram(s) IV Push once  enoxaparin Injectable 40 milliGRAM(s) SubCutaneous daily  insulin glargine Injectable (LANTUS) 24 Unit(s) SubCutaneous at bedtime  insulin lispro (HumaLOG) corrective regimen sliding scale   SubCutaneous at bedtime  insulin lispro (HumaLOG) corrective regimen sliding scale   SubCutaneous three times a day before meals  insulin lispro Injectable (HumaLOG) 7 Unit(s) SubCutaneous three times a day before meals  levoFLOXacin  Tablet 500 milliGRAM(s) Oral every 24 hours  predniSONE   Tablet 20 milliGRAM(s) Oral daily  simvastatin 20 milliGRAM(s) Oral at bedtime    MEDICATIONS  (PRN):  acetaminophen   Tablet .. 650 milliGRAM(s) Oral every 6 hours PRN Temp greater or equal to 38C (100.4F)  dextrose 40% Gel 15 Gram(s) Oral once PRN Blood Glucose LESS THAN 70 milliGRAM(s)/deciLiter  glucagon  Injectable 1 milliGRAM(s) IntraMuscular once PRN Glucose <70 milliGRAM(s)/deciLiter      Allergies    No Known Allergies    Intolerances        LABS:                        14.0   8.2   )-----------( 243      ( 23 Oct 2018 06:06 )             41.9     10-23    138  |  105  |  14  ----------------------------<  209<H>  4.0   |  26  |  0.85    Ca    9.2      23 Oct 2018 06:06  Phos  3.7     10-22  Mg     2.0     10-22    TPro  7.2  /  Alb  3.1<L>  /  TBili  0.4  /  DBili  x   /  AST  33  /  ALT  35  /  AlkPhos  112  10-22              CAPILLARY BLOOD GLUCOSE      POCT Blood Glucose.: 194 mg/dL (23 Oct 2018 08:16)  POCT Blood Glucose.: 399 mg/dL (22 Oct 2018 21:14)  POCT Blood Glucose.: 330 mg/dL (22 Oct 2018 16:51)  POCT Blood Glucose.: 287 mg/dL (22 Oct 2018 11:39)        RADIOLOGY & ADDITIONAL TESTS:  CXR:  < from: Xray Chest 1 View-PORTABLE IMMEDIATE (10.19.18 @ 11:53) >  FINDINGS/  IMPRESSION:  Left apical pleural thickening without significant change.    No consolidation or pleural effusion.    Heart size cannot be accurately assessed in this projection.    < end of copied text >    Ct scan chest:    ekg;    echo:

## 2018-10-23 NOTE — PROGRESS NOTE ADULT - SUBJECTIVE AND OBJECTIVE BOX
PGY1 Note discussed with supervising resident and primary attending.    Patient is a 83y old  Female who presents with a chief complaint of Hyperglycemia (23 Oct 2018 10:03)      INTERVAL HPI/OVERNIGHT EVENTS: Pt is still having mild wheeze and cough. Started on leflox from today 10/23/2018. Blood glucose level was running on higher side for which we are increasing lantus from 20 units to 24 units.  Pt daughter to do insulin teaching.    MEDICATIONS  (STANDING):  ALBUTerol/ipratropium for Nebulization 3 milliLiter(s) Nebulizer every 6 hours  ATENolol  Tablet 50 milliGRAM(s) Oral daily  dextrose 5%. 1000 milliLiter(s) (50 mL/Hr) IV Continuous <Continuous>  dextrose 50% Injectable 12.5 Gram(s) IV Push once  dextrose 50% Injectable 25 Gram(s) IV Push once  dextrose 50% Injectable 25 Gram(s) IV Push once  enoxaparin Injectable 40 milliGRAM(s) SubCutaneous daily  insulin glargine Injectable (LANTUS) 24 Unit(s) SubCutaneous at bedtime  insulin lispro (HumaLOG) corrective regimen sliding scale   SubCutaneous at bedtime  insulin lispro (HumaLOG) corrective regimen sliding scale   SubCutaneous three times a day before meals  insulin lispro Injectable (HumaLOG) 7 Unit(s) SubCutaneous three times a day before meals  levoFLOXacin  Tablet 500 milliGRAM(s) Oral every 24 hours  predniSONE   Tablet 20 milliGRAM(s) Oral daily  simvastatin 20 milliGRAM(s) Oral at bedtime    MEDICATIONS  (PRN):  acetaminophen   Tablet .. 650 milliGRAM(s) Oral every 6 hours PRN Temp greater or equal to 38C (100.4F)  dextrose 40% Gel 15 Gram(s) Oral once PRN Blood Glucose LESS THAN 70 milliGRAM(s)/deciLiter  glucagon  Injectable 1 milliGRAM(s) IntraMuscular once PRN Glucose <70 milliGRAM(s)/deciLiter      Allergies    No Known Allergies    Intolerances        REVIEW OF SYSTEMS:  CONSTITUTIONAL: No fever, weight loss, or fatigue  RESPIRATORY: No cough, wheezing, chills or hemoptysis; No shortness of breath  CARDIOVASCULAR: No chest pain, palpitations, dizziness, or leg swelling  GASTROINTESTINAL: No abdominal or epigastric pain. No nausea, vomiting, or hematemesis; No diarrhea or constipation. No melena or hematochezia.  NEUROLOGICAL: No headaches, memory loss, loss of strength, numbness, or tremors  SKIN: No itching, burning, rashes, or lesions     Vital Signs Last 24 Hrs  T(C): 36.6 (23 Oct 2018 05:35), Max: 36.6 (22 Oct 2018 21:31)  T(F): 97.8 (23 Oct 2018 05:35), Max: 97.9 (22 Oct 2018 21:31)  HR: 87 (23 Oct 2018 05:35) (87 - 93)  BP: 105/47 (23 Oct 2018 05:35) (105/47 - 105/67)  BP(mean): --  RR: 17 (23 Oct 2018 05:35) (16 - 17)  SpO2: 94% (23 Oct 2018 05:35) (93% - 94%)    PHYSICAL EXAM:  GENERAL: NAD, well-groomed, well-developed  HEAD:  Atraumatic, Normocephalic  EYES: EOMI, PERRLA, conjunctiva and sclera clear  NECK: Supple, No JVD, Normal thyroid  CHEST/LUNG: mild wheeze b/l  HEART: Regular rate and rhythm; No murmurs, rubs, or gallops  ABDOMEN: Soft, Nontender, Nondistended; Bowel sounds present  NERVOUS SYSTEM:  Alert & Oriented X3, Good concentration; Motor Strength 5/5 B/L   EXTREMITIES:  2+ Peripheral Pulses, No clubbing, cyanosis, or edema  SKIN;    LABS:                        14.0   8.2   )-----------( 243      ( 23 Oct 2018 06:06 )             41.9     10-23    138  |  105  |  14  ----------------------------<  209<H>  4.0   |  26  |  0.85    Ca    9.2      23 Oct 2018 06:06  Phos  3.7     10-22  Mg     2.0     10-22    TPro  7.2  /  Alb  3.1<L>  /  TBili  0.4  /  DBili  x   /  AST  33  /  ALT  35  /  AlkPhos  112  10-22        CAPILLARY BLOOD GLUCOSE      POCT Blood Glucose.: 384 mg/dL (23 Oct 2018 11:59)  POCT Blood Glucose.: 194 mg/dL (23 Oct 2018 08:16)  POCT Blood Glucose.: 399 mg/dL (22 Oct 2018 21:14)  POCT Blood Glucose.: 330 mg/dL (22 Oct 2018 16:51)      RADIOLOGY & ADDITIONAL TESTS:    Imaging Personally Reviewed:  [ ] YES  [ ] NO    Consultant(s) Notes Reviewed:  [ ] YES  [ ] NO

## 2018-10-23 NOTE — PROGRESS NOTE ADULT - PROBLEM SELECTOR PLAN 1
pt is no longer having fever.  Blood Culture: Negative  UA: negative  CXR: no acute path  RVP: positive for enterovirus.

## 2018-10-23 NOTE — PROGRESS NOTE ADULT - ASSESSMENT
Pt is an 84 y/o F, from home, with a PMHx of DM type II (oral medication no insulin) who presents to the ED with complaints of elevated blood glucose at home.  In the ED, pt presents in no acute distress, and vitals WNL.  Pt remains afebrile w/o leukocytosis.  Labs sig for glucose of 534.      Pt will be admitted to medicine for management of hyperglycemia     DC planning  tomorrow if blood glucose remains stable
1.diabetes  poorly controlled  aic ?15  started on insulin/increase lantus 12/humalog 3 premeals  educate family
Pt is an 82 y/o F, from home, with a PMHx of DM type II (oral medication no insulin) who presents to the ED with complaints of elevated blood glucose at home.  In the ED, pt presents in no acute distress, and vitals WNL.  Pt remains afebrile w/o leukocytosis.  Labs sig for glucose of 534.      Pt will be admitted to medicine for management of hyperglycemia
Pt is an 84 y/o F, from home, with a PMHx of DM type II (oral medication no insulin) who presents to the ED with complaints of elevated blood glucose at home.  In the ED, pt presents in no acute distress, and vitals WNL.  Pt remains afebrile w/o leukocytosis.  Labs sig for glucose of 534.      Pt will be admitted to medicine for management of hyperglycemia
Pt is an 84 y/o F, from home, with a PMHx of DM type II (oral medication no insulin) who presents to the ED with complaints of elevated blood glucose at home.  In the ED, pt presents in no acute distress, and vitals WNL.  Pt remains afebrile w/o leukocytosis.  Labs sig for glucose of 534.      Pt will be admitted to medicine for management of hyperglycemia

## 2018-10-24 LAB
ANION GAP SERPL CALC-SCNC: 7 MMOL/L — SIGNIFICANT CHANGE UP (ref 5–17)
BUN SERPL-MCNC: 19 MG/DL — HIGH (ref 7–18)
CALCIUM SERPL-MCNC: 9.1 MG/DL — SIGNIFICANT CHANGE UP (ref 8.4–10.5)
CHLORIDE SERPL-SCNC: 108 MMOL/L — SIGNIFICANT CHANGE UP (ref 96–108)
CO2 SERPL-SCNC: 26 MMOL/L — SIGNIFICANT CHANGE UP (ref 22–31)
CREAT SERPL-MCNC: 0.87 MG/DL — SIGNIFICANT CHANGE UP (ref 0.5–1.3)
CULTURE RESULTS: SIGNIFICANT CHANGE UP
CULTURE RESULTS: SIGNIFICANT CHANGE UP
GAMMA INTERFERON BACKGROUND BLD IA-ACNC: 0.04 IU/ML — SIGNIFICANT CHANGE UP
GLUCOSE BLDC GLUCOMTR-MCNC: 161 MG/DL — HIGH (ref 70–99)
GLUCOSE BLDC GLUCOMTR-MCNC: 267 MG/DL — HIGH (ref 70–99)
GLUCOSE BLDC GLUCOMTR-MCNC: 282 MG/DL — HIGH (ref 70–99)
GLUCOSE BLDC GLUCOMTR-MCNC: 75 MG/DL — SIGNIFICANT CHANGE UP (ref 70–99)
GLUCOSE SERPL-MCNC: 54 MG/DL — LOW (ref 70–99)
M TB IFN-G BLD-IMP: NEGATIVE — SIGNIFICANT CHANGE UP
M TB IFN-G CD4+ BCKGRND COR BLD-ACNC: 0.06 IU/ML — SIGNIFICANT CHANGE UP
M TB IFN-G CD4+CD8+ BCKGRND COR BLD-ACNC: 0.17 IU/ML — SIGNIFICANT CHANGE UP
POTASSIUM SERPL-MCNC: 3.5 MMOL/L — SIGNIFICANT CHANGE UP (ref 3.5–5.3)
POTASSIUM SERPL-SCNC: 3.5 MMOL/L — SIGNIFICANT CHANGE UP (ref 3.5–5.3)
QUANT TB PLUS MITOGEN MINUS NIL: 9.68 IU/ML — SIGNIFICANT CHANGE UP
SODIUM SERPL-SCNC: 141 MMOL/L — SIGNIFICANT CHANGE UP (ref 135–145)
SPECIMEN SOURCE: SIGNIFICANT CHANGE UP
SPECIMEN SOURCE: SIGNIFICANT CHANGE UP

## 2018-10-24 RX ORDER — INSULIN GLARGINE 100 [IU]/ML
20 INJECTION, SOLUTION SUBCUTANEOUS AT BEDTIME
Qty: 0 | Refills: 0 | Status: DISCONTINUED | OUTPATIENT
Start: 2018-10-24 | End: 2018-10-25

## 2018-10-24 RX ADMIN — Medication 3 MILLILITER(S): at 21:42

## 2018-10-24 RX ADMIN — Medication 3 MILLILITER(S): at 08:55

## 2018-10-24 RX ADMIN — ENOXAPARIN SODIUM 40 MILLIGRAM(S): 100 INJECTION SUBCUTANEOUS at 11:29

## 2018-10-24 RX ADMIN — ATENOLOL 50 MILLIGRAM(S): 25 TABLET ORAL at 05:26

## 2018-10-24 RX ADMIN — Medication 20 MILLIGRAM(S): at 05:26

## 2018-10-24 RX ADMIN — Medication 7 UNIT(S): at 17:14

## 2018-10-24 RX ADMIN — Medication 3 MILLILITER(S): at 03:33

## 2018-10-24 RX ADMIN — Medication 3 MILLILITER(S): at 14:49

## 2018-10-24 RX ADMIN — SIMVASTATIN 20 MILLIGRAM(S): 20 TABLET, FILM COATED ORAL at 21:35

## 2018-10-24 RX ADMIN — Medication 7 UNIT(S): at 12:24

## 2018-10-24 RX ADMIN — Medication 3: at 12:23

## 2018-10-24 RX ADMIN — Medication 3: at 17:13

## 2018-10-24 RX ADMIN — INSULIN GLARGINE 20 UNIT(S): 100 INJECTION, SOLUTION SUBCUTANEOUS at 21:35

## 2018-10-24 NOTE — PROGRESS NOTE ADULT - PROBLEM SELECTOR PLAN 4
-c/w statin therapy
[] Previous VTE                                                3  [] Thrombophilia                                             2  [] Lower limb paralysis                                   2    [] Current Cancer                                             2   [x] Immobilization > 24 hrs                              1  [] ICU/CCU stay > 24 hours                             1  [x] Age > 60                                                         1    IMPROVE VTE Score: 2; Lovenox sub q for DVT prophy
monitor BP  cont meds.
monitor BP  cont meds.
-c/w statin therapy
[] Previous VTE                                                3  [] Thrombophilia                                             2  [] Lower limb paralysis                                   2    [] Current Cancer                                             2   [x] Immobilization > 24 hrs                              1  [] ICU/CCU stay > 24 hours                             1  [x] Age > 60                                                         1    IMPROVE VTE Score: 2; Lovenox sub q for DVT prophy
[] Previous VTE                                                3  [] Thrombophilia                                             2  [] Lower limb paralysis                                   2    [] Current Cancer                                             2   [x] Immobilization > 24 hrs                              1  [] ICU/CCU stay > 24 hours                             1  [x] Age > 60                                                         1    IMPROVE VTE Score: 2; Lovenox sub q for DVT prophy

## 2018-10-24 NOTE — PROGRESS NOTE ADULT - PROBLEM SELECTOR PLAN 2
-c/w atenolol therapy- 100mg daily  -Monitor BP  -Dash diet
Quantiferon TB Gold test.
Quantiferon TB Gold test.
-A1C: 15  - most likely because of poor diet compliance-   Lantus: increased to 15, Humalog 3 TID before meals  Finger sticks  -Endo follow up   Diabetic teaching to daughter
-A1C: 15  - most likely because of poor diet compliance-   Lantus: increased to 20, Humalog 7 TID before meals  Finger sticks  -Endo follow up   Diabetic teaching to daughter
-A1C: 15  - most likely because of poor diet compliance-   Lantus: increased to 24, Humalog 7 TID before meals  Finger sticks  -Endo follow up   Diabetic teaching to daughter
-c/w atenolol therapy- 100mg daily  -Monitor BP  -Dash diet
-c/w atenolol therapy- 100mg daily  -Monitor BP  -Dash diet
-A1C: 15  - most likely because of poor diet compliance-   Lantus: increased to 20, Humalog 7 TID before meals  Finger sticks  -Endo follow up   Diabetic teaching to daughter

## 2018-10-24 NOTE — PROGRESS NOTE ADULT - PROBLEM SELECTOR PROBLEM 2
HTN (hypertension)
Pleural thickening
Pleural thickening
Diabetes
HTN (hypertension)
HTN (hypertension)
Diabetes

## 2018-10-24 NOTE — PROGRESS NOTE ADULT - SUBJECTIVE AND OBJECTIVE BOX
Patient is a 83y old  Female who presents with a chief complaint of Hyperglycemia (24 Oct 2018 10:13)     Awake, alert, comfortable out of bed in NAD. With wheezing b/l. No sob at rest. Former smoker  INTERVAL HPI/OVERNIGHT EVENTS:      VITAL SIGNS:  T(F): 98.1 (10-24-18 @ 14:33)  HR: 87 (10-24-18 @ 14:33)  BP: 112/68 (10-24-18 @ 14:33)  RR: 17 (10-24-18 @ 14:33)  SpO2: 95% (10-24-18 @ 14:33)  Wt(kg): --  I&O's Detail          REVIEW OF SYSTEMS:    CONSTITUTIONAL:  No fevers, chills, sweats    HEENT:  Eyes:  No diplopia or blurred vision. ENT:  No earache, sore throat or runny nose.    CARDIOVASCULAR:  No pressure, squeezing, tightness, or heaviness about the chest; no palpitations.    RESPIRATORY:  Per HPI    GASTROINTESTINAL:  No abdominal pain, nausea, vomiting or diarrhea.    GENITOURINARY:  No dysuria, frequency or urgency.    NEUROLOGIC:  No paresthesias, fasciculations, seizures or weakness.    PSYCHIATRIC:  No disorder of thought or mood.      PHYSICAL EXAM:    Constitutional: Well developed and nourished  Eyes:Perrla  ENMT: normal  Neck:supple  Respiratory:  + Few scattered wheezes bilaterally  Cardiovascular: S1 S2 regular  Gastrointestinal: Soft, Non tender  Extremities: No edema  Vascular:normal  Neurological:Awake, alert,Ox3  Musculoskeletal:Normal      MEDICATIONS  (STANDING):  ALBUTerol/ipratropium for Nebulization 3 milliLiter(s) Nebulizer every 6 hours  ATENolol  Tablet 50 milliGRAM(s) Oral daily  dextrose 5%. 1000 milliLiter(s) (50 mL/Hr) IV Continuous <Continuous>  dextrose 50% Injectable 12.5 Gram(s) IV Push once  dextrose 50% Injectable 25 Gram(s) IV Push once  dextrose 50% Injectable 25 Gram(s) IV Push once  enoxaparin Injectable 40 milliGRAM(s) SubCutaneous daily  insulin glargine Injectable (LANTUS) 20 Unit(s) SubCutaneous at bedtime  insulin lispro (HumaLOG) corrective regimen sliding scale   SubCutaneous at bedtime  insulin lispro (HumaLOG) corrective regimen sliding scale   SubCutaneous three times a day before meals  insulin lispro Injectable (HumaLOG) 7 Unit(s) SubCutaneous three times a day before meals  levoFLOXacin  Tablet 500 milliGRAM(s) Oral every 24 hours  predniSONE   Tablet 20 milliGRAM(s) Oral daily  simvastatin 20 milliGRAM(s) Oral at bedtime    MEDICATIONS  (PRN):  acetaminophen   Tablet .. 650 milliGRAM(s) Oral every 6 hours PRN Temp greater or equal to 38C (100.4F)  dextrose 40% Gel 15 Gram(s) Oral once PRN Blood Glucose LESS THAN 70 milliGRAM(s)/deciLiter  glucagon  Injectable 1 milliGRAM(s) IntraMuscular once PRN Glucose <70 milliGRAM(s)/deciLiter      Allergies    No Known Allergies    Intolerances        LABS:                        14.0   8.2   )-----------( 243      ( 23 Oct 2018 06:06 )             41.9     10-24    141  |  108  |  19<H>  ----------------------------<  54<L>  3.5   |  26  |  0.87    Ca    9.1      24 Oct 2018 07:20                CAPILLARY BLOOD GLUCOSE      POCT Blood Glucose.: 282 mg/dL (24 Oct 2018 12:04)  POCT Blood Glucose.: 75 mg/dL (24 Oct 2018 08:17)  POCT Blood Glucose.: 265 mg/dL (23 Oct 2018 21:05)  POCT Blood Glucose.: 292 mg/dL (23 Oct 2018 17:27)        RADIOLOGY & ADDITIONAL TESTS:  CXR:  < from: Xray Chest 1 View-PORTABLE IMMEDIATE (10.19.18 @ 11:53) >  FINDINGS/  IMPRESSION:  Left apical pleural thickening without significant change.    No consolidation or pleural effusion.    Heart size cannot be accurately assessed in this projection.    < end of copied text >    Ct scan chest:    ekg;    echo:

## 2018-10-24 NOTE — PROGRESS NOTE ADULT - PROBLEM SELECTOR PROBLEM 4
HTN (hypertension)
HTN (hypertension)
Need for prophylactic measure
HLD (hyperlipidemia)
Need for prophylactic measure
Need for prophylactic measure
HLD (hyperlipidemia)

## 2018-10-24 NOTE — PROGRESS NOTE ADULT - PROBLEM SELECTOR PLAN 3
-c/w statin therapy
Accuchecks with reg insulin coverage  HBA1C  cont meds.
Accuchecks with reg insulin coverage  HBA1C  cont meds.
-c/w atenolol therapy- 100mg daily  -Monitor BP  -Dash diet
-c/w statin therapy
-c/w statin therapy
-c/w atenolol therapy- 100mg daily  -Monitor BP  -Dash diet

## 2018-10-24 NOTE — PROGRESS NOTE ADULT - SUBJECTIVE AND OBJECTIVE BOX
Patient is a 83y old  Female who presents with a chief complaint of Hyperglycemia (23 Oct 2018 14:29)      pt seen in ed [  ], reg med floor [  x ], bed [  ], chair at bedside [ x  ], a+o x3 [ x ], lethargic [  ],  nad [ x ]      Allergies    No Known Allergies        Vitals    T(F): 98.4 (10-24-18 @ 05:22), Max: 98.4 (10-24-18 @ 05:22)  HR: 91 (10-24-18 @ 05:22) (91 - 100)  BP: 120/70 (10-24-18 @ 05:22) (115/61 - 126/68)  RR: 18 (10-24-18 @ 05:22) (18 - 18)  SpO2: 94% (10-24-18 @ 05:22) (94% - 100%)  Wt(kg): --  CAPILLARY BLOOD GLUCOSE      POCT Blood Glucose.: 75 mg/dL (24 Oct 2018 08:17)      Labs                          14.0   8.2   )-----------( 243      ( 23 Oct 2018 06:06 )             41.9       10-24    141  |  108  |  19<H>  ----------------------------<  54<L>  3.5   |  26  |  0.87    Ca    9.1      24 Oct 2018 07:20              .Blood Blood-Peripheral  10-19 @ 14:19   No growth to date.  --  --      .Urine Clean Catch (Midstream)  10-14 @ 21:59   No growth  --  --          Radiology Results      Meds    MEDICATIONS  (STANDING):  ALBUTerol/ipratropium for Nebulization 3 milliLiter(s) Nebulizer every 6 hours  ATENolol  Tablet 50 milliGRAM(s) Oral daily  dextrose 5%. 1000 milliLiter(s) (50 mL/Hr) IV Continuous <Continuous>  dextrose 50% Injectable 12.5 Gram(s) IV Push once  dextrose 50% Injectable 25 Gram(s) IV Push once  dextrose 50% Injectable 25 Gram(s) IV Push once  enoxaparin Injectable 40 milliGRAM(s) SubCutaneous daily  insulin glargine Injectable (LANTUS) 24 Unit(s) SubCutaneous at bedtime  insulin lispro (HumaLOG) corrective regimen sliding scale   SubCutaneous at bedtime  insulin lispro (HumaLOG) corrective regimen sliding scale   SubCutaneous three times a day before meals  insulin lispro Injectable (HumaLOG) 7 Unit(s) SubCutaneous three times a day before meals  levoFLOXacin  Tablet 500 milliGRAM(s) Oral every 24 hours  predniSONE   Tablet 20 milliGRAM(s) Oral daily  simvastatin 20 milliGRAM(s) Oral at bedtime      MEDICATIONS  (PRN):  acetaminophen   Tablet .. 650 milliGRAM(s) Oral every 6 hours PRN Temp greater or equal to 38C (100.4F)  dextrose 40% Gel 15 Gram(s) Oral once PRN Blood Glucose LESS THAN 70 milliGRAM(s)/deciLiter  glucagon  Injectable 1 milliGRAM(s) IntraMuscular once PRN Glucose <70 milliGRAM(s)/deciLiter      Physical Exam    Neuro :  no focal deficits  Respiratory: wheezing  B/L  CV: RRR, S1S2, no murmurs,   Abdominal: Soft, NT, ND +BS,  Extremities: No edema, + peripheral pulses    ASSESSMENT      fever 2nd to entero/rhino virus  uncontrolled dm  h/o DM type 2 (diabetes mellitus, type 2)  No significant past surgical history      PLAN      blood cx javy noted   ucx neg noted   cxr neg for acute patho noted   rapid rvp with entero/rhino virus noted   cont antipyretics prn  cont duonebs q6  pulm f/u  pt still coughing  cont levaquin 500mg daily to complete 7 days  cont short course steroids  endo f/u   cont Lantus 24 units qhs   cont humalog 7 units premeals  Hba1C, 15 noted   serum glucose improving  phys tx eval noted and rec rolling walker and home phys tx  cont current meds Patient is a 83y old  Female who presents with a chief complaint of Hyperglycemia (23 Oct 2018 14:29)      pt seen in ed [  ], reg med floor [  x ], bed [  ], chair at bedside [ x  ], a+o x3 [ x ], lethargic [  ],  nad [ x ]      Allergies    No Known Allergies        Vitals    T(F): 98.4 (10-24-18 @ 05:22), Max: 98.4 (10-24-18 @ 05:22)  HR: 91 (10-24-18 @ 05:22) (91 - 100)  BP: 120/70 (10-24-18 @ 05:22) (115/61 - 126/68)  RR: 18 (10-24-18 @ 05:22) (18 - 18)  SpO2: 94% (10-24-18 @ 05:22) (94% - 100%)  Wt(kg): --  CAPILLARY BLOOD GLUCOSE      POCT Blood Glucose.: 75 mg/dL (24 Oct 2018 08:17)      Labs                          14.0   8.2   )-----------( 243      ( 23 Oct 2018 06:06 )             41.9       10-24    141  |  108  |  19<H>  ----------------------------<  54<L>  3.5   |  26  |  0.87    Ca    9.1      24 Oct 2018 07:20              .Blood Blood-Peripheral  10-19 @ 14:19   No growth to date.  --  --      .Urine Clean Catch (Midstream)  10-14 @ 21:59   No growth  --  --          Radiology Results      Meds    MEDICATIONS  (STANDING):  ALBUTerol/ipratropium for Nebulization 3 milliLiter(s) Nebulizer every 6 hours  ATENolol  Tablet 50 milliGRAM(s) Oral daily  dextrose 5%. 1000 milliLiter(s) (50 mL/Hr) IV Continuous <Continuous>  dextrose 50% Injectable 12.5 Gram(s) IV Push once  dextrose 50% Injectable 25 Gram(s) IV Push once  dextrose 50% Injectable 25 Gram(s) IV Push once  enoxaparin Injectable 40 milliGRAM(s) SubCutaneous daily  insulin glargine Injectable (LANTUS) 24 Unit(s) SubCutaneous at bedtime  insulin lispro (HumaLOG) corrective regimen sliding scale   SubCutaneous at bedtime  insulin lispro (HumaLOG) corrective regimen sliding scale   SubCutaneous three times a day before meals  insulin lispro Injectable (HumaLOG) 7 Unit(s) SubCutaneous three times a day before meals  levoFLOXacin  Tablet 500 milliGRAM(s) Oral every 24 hours  predniSONE   Tablet 20 milliGRAM(s) Oral daily  simvastatin 20 milliGRAM(s) Oral at bedtime      MEDICATIONS  (PRN):  acetaminophen   Tablet .. 650 milliGRAM(s) Oral every 6 hours PRN Temp greater or equal to 38C (100.4F)  dextrose 40% Gel 15 Gram(s) Oral once PRN Blood Glucose LESS THAN 70 milliGRAM(s)/deciLiter  glucagon  Injectable 1 milliGRAM(s) IntraMuscular once PRN Glucose <70 milliGRAM(s)/deciLiter      Physical Exam    Neuro :  no focal deficits  Respiratory: wheezing  B/L  CV: RRR, S1S2, no murmurs,   Abdominal: Soft, NT, ND +BS,  Extremities: No edema, + peripheral pulses    ASSESSMENT      fever 2nd to entero/rhino virus  uncontrolled dm  h/o DM type 2 (diabetes mellitus, type 2)  No significant past surgical history      PLAN    blood cx javy noted   ucx neg noted   cxr neg for acute patho noted   rapid rvp with entero/rhino virus noted   cont antipyretics prn  cont duonebs q6  pulm f/u  pt still coughing  cont Levaquin 500mg daily to complete 7 days  cont short course steroids  endo f/u   cont Lantus 24 units qhs   cont humalog 7 units premeals  Hba1C, 15 noted   serum glucose improving  phys tx eval noted and rec rolling walker and home phys tx  cont current meds  d/c planning

## 2018-10-24 NOTE — PROGRESS NOTE ADULT - PROBLEM SELECTOR PROBLEM 5
Need for prophylactic measure
Fever
Fever
Need for prophylactic measure

## 2018-10-24 NOTE — PROGRESS NOTE ADULT - PROBLEM SELECTOR PROBLEM 3
Diabetes
Diabetes
HLD (hyperlipidemia)
HTN (hypertension)

## 2018-10-24 NOTE — PROGRESS NOTE ADULT - PROBLEM SELECTOR PLAN 5
[] Previous VTE                                                3  [] Thrombophilia                                             2  [] Lower limb paralysis                                   2    [] Current Cancer                                             2   [x] Immobilization > 24 hrs                              1  [] ICU/CCU stay > 24 hours                             1  [x] Age > 60                                                         1    IMPROVE VTE Score: 2; Lovenox sub q for DVT prophy
Check pending cultures  cont antibiotics  ID follow up.
Check pending cultures  cont antibiotics  ID follow up.
[] Previous VTE                                                3  [] Thrombophilia                                             2  [] Lower limb paralysis                                   2    [] Current Cancer                                             2   [x] Immobilization > 24 hrs                              1  [] ICU/CCU stay > 24 hours                             1  [x] Age > 60                                                         1    IMPROVE VTE Score: 2; Lovenox sub q for DVT prophy

## 2018-10-25 VITALS
SYSTOLIC BLOOD PRESSURE: 107 MMHG | TEMPERATURE: 98 F | OXYGEN SATURATION: 95 % | DIASTOLIC BLOOD PRESSURE: 51 MMHG | HEART RATE: 79 BPM | RESPIRATION RATE: 17 BRPM

## 2018-10-25 LAB
ANION GAP SERPL CALC-SCNC: 7 MMOL/L — SIGNIFICANT CHANGE UP (ref 5–17)
BUN SERPL-MCNC: 18 MG/DL — SIGNIFICANT CHANGE UP (ref 7–18)
CALCIUM SERPL-MCNC: 9 MG/DL — SIGNIFICANT CHANGE UP (ref 8.4–10.5)
CHLORIDE SERPL-SCNC: 111 MMOL/L — HIGH (ref 96–108)
CO2 SERPL-SCNC: 27 MMOL/L — SIGNIFICANT CHANGE UP (ref 22–31)
CREAT SERPL-MCNC: 0.9 MG/DL — SIGNIFICANT CHANGE UP (ref 0.5–1.3)
GLUCOSE BLDC GLUCOMTR-MCNC: 102 MG/DL — HIGH (ref 70–99)
GLUCOSE BLDC GLUCOMTR-MCNC: 183 MG/DL — HIGH (ref 70–99)
GLUCOSE BLDC GLUCOMTR-MCNC: 236 MG/DL — HIGH (ref 70–99)
GLUCOSE SERPL-MCNC: 78 MG/DL — SIGNIFICANT CHANGE UP (ref 70–99)
POTASSIUM SERPL-MCNC: 4.4 MMOL/L — SIGNIFICANT CHANGE UP (ref 3.5–5.3)
POTASSIUM SERPL-SCNC: 4.4 MMOL/L — SIGNIFICANT CHANGE UP (ref 3.5–5.3)
SODIUM SERPL-SCNC: 145 MMOL/L — SIGNIFICANT CHANGE UP (ref 135–145)

## 2018-10-25 RX ORDER — CIPROFLOXACIN LACTATE 400MG/40ML
1 VIAL (ML) INTRAVENOUS
Qty: 4 | Refills: 0 | OUTPATIENT
Start: 2018-10-25 | End: 2018-10-28

## 2018-10-25 RX ORDER — INSULIN LISPRO 100/ML
7 VIAL (ML) SUBCUTANEOUS
Qty: 1 | Refills: 0 | OUTPATIENT
Start: 2018-10-25 | End: 2018-11-21

## 2018-10-25 RX ORDER — INFLUENZA VIRUS VACCINE 15; 15; 15; 15 UG/.5ML; UG/.5ML; UG/.5ML; UG/.5ML
0.5 SUSPENSION INTRAMUSCULAR ONCE
Qty: 0 | Refills: 0 | Status: COMPLETED | OUTPATIENT
Start: 2018-10-25 | End: 2018-10-25

## 2018-10-25 RX ORDER — INSULIN GLARGINE 100 [IU]/ML
20 INJECTION, SOLUTION SUBCUTANEOUS
Qty: 1 | Refills: 0 | OUTPATIENT
Start: 2018-10-25 | End: 2018-11-21

## 2018-10-25 RX ORDER — INSULIN GLARGINE 100 [IU]/ML
20 INJECTION, SOLUTION SUBCUTANEOUS
Qty: 0 | Refills: 0 | COMMUNITY

## 2018-10-25 RX ADMIN — Medication 3 MILLILITER(S): at 08:49

## 2018-10-25 RX ADMIN — Medication 20 MILLIGRAM(S): at 05:23

## 2018-10-25 RX ADMIN — Medication 3 MILLILITER(S): at 14:53

## 2018-10-25 RX ADMIN — ENOXAPARIN SODIUM 40 MILLIGRAM(S): 100 INJECTION SUBCUTANEOUS at 11:29

## 2018-10-25 RX ADMIN — Medication 7 UNIT(S): at 11:30

## 2018-10-25 RX ADMIN — Medication 7 UNIT(S): at 16:54

## 2018-10-25 RX ADMIN — ATENOLOL 50 MILLIGRAM(S): 25 TABLET ORAL at 05:23

## 2018-10-25 RX ADMIN — Medication 1: at 16:54

## 2018-10-25 RX ADMIN — INFLUENZA VIRUS VACCINE 0.5 MILLILITER(S): 15; 15; 15; 15 SUSPENSION INTRAMUSCULAR at 16:04

## 2018-10-25 RX ADMIN — Medication 2: at 11:29

## 2018-10-25 NOTE — CHART NOTE - NSCHARTNOTEFT_GEN_A_CORE
patient with generalized muscle weakness due to her history of diabetes mellitis and is unable to safely ambulate with a walker, cane or crutches. She will require a wheelchair to access the bathroom for toileting and dinning facilities with in her residence. She is agreeable to the wheelchair and has a 24 hr assist with the wheelchair.

## 2018-10-25 NOTE — PROGRESS NOTE ADULT - PROVIDER SPECIALTY LIST ADULT
Diabetes
Internal Medicine
Pulmonology
Pulmonology
Internal Medicine

## 2018-10-25 NOTE — PROGRESS NOTE ADULT - SUBJECTIVE AND OBJECTIVE BOX
Patient is a 83y old  Female who presents with a chief complaint of Hyperglycemia (24 Oct 2018 15:21)    pt seen in icu [  ], reg med floor [   ], bed [  ], chair at bedside [   ], a+o x3 [  ], lethargic [  ],  nad [  ]    sanchez [  ], ngt [  ], peg [  ], et tube [  ], cent line [  ], picc line [  ]        Allergies    No Known Allergies        Vitals    T(F): 98.2 (10-25-18 @ 05:30), Max: 98.2 (10-25-18 @ 05:30)  HR: 80 (10-25-18 @ 05:30) (80 - 87)  BP: 120/54 (10-25-18 @ 05:30) (99/72 - 120/54)  RR: 18 (10-25-18 @ 05:30) (17 - 18)  SpO2: 97% (10-25-18 @ 05:30) (94% - 97%)  Wt(kg): --  CAPILLARY BLOOD GLUCOSE      POCT Blood Glucose.: 102 mg/dL (25 Oct 2018 08:10)      Labs        10-25    145  |  111<H>  |  18  ----------------------------<  78  4.4   |  27  |  0.90    Ca    9.0      25 Oct 2018 06:50              .Blood Blood-Peripheral  10-19 @ 14:19   No growth at 5 days.  --  --      .Urine Clean Catch (Midstream)  10-14 @ 21:59   No growth  --  --          Radiology Results      Meds    MEDICATIONS  (STANDING):  ALBUTerol/ipratropium for Nebulization 3 milliLiter(s) Nebulizer every 6 hours  ATENolol  Tablet 50 milliGRAM(s) Oral daily  dextrose 5%. 1000 milliLiter(s) (50 mL/Hr) IV Continuous <Continuous>  dextrose 50% Injectable 12.5 Gram(s) IV Push once  dextrose 50% Injectable 25 Gram(s) IV Push once  dextrose 50% Injectable 25 Gram(s) IV Push once  enoxaparin Injectable 40 milliGRAM(s) SubCutaneous daily  insulin glargine Injectable (LANTUS) 20 Unit(s) SubCutaneous at bedtime  insulin lispro (HumaLOG) corrective regimen sliding scale   SubCutaneous at bedtime  insulin lispro (HumaLOG) corrective regimen sliding scale   SubCutaneous three times a day before meals  insulin lispro Injectable (HumaLOG) 7 Unit(s) SubCutaneous three times a day before meals  levoFLOXacin  Tablet 500 milliGRAM(s) Oral every 24 hours  simvastatin 20 milliGRAM(s) Oral at bedtime      MEDICATIONS  (PRN):  acetaminophen   Tablet .. 650 milliGRAM(s) Oral every 6 hours PRN Temp greater or equal to 38C (100.4F)  dextrose 40% Gel 15 Gram(s) Oral once PRN Blood Glucose LESS THAN 70 milliGRAM(s)/deciLiter  glucagon  Injectable 1 milliGRAM(s) IntraMuscular once PRN Glucose <70 milliGRAM(s)/deciLiter      Physical Exam    Neuro :  no focal deficits  Respiratory: CTA B/L  CV: RRR, S1S2, no murmurs,   Abdominal: Soft, NT, ND +BS,  Extremities: No edema, + peripheral pulses    ASSESSMENT    Type 2 diabetes mellitus with hyperglycemia  DM type 2 (diabetes mellitus, type 2)  No significant past surgical history      PLAN

## 2018-10-25 NOTE — PROGRESS NOTE ADULT - REASON FOR ADMISSION
Hyperglycemia

## 2018-12-26 PROCEDURE — 82962 GLUCOSE BLOOD TEST: CPT

## 2018-12-26 PROCEDURE — 83735 ASSAY OF MAGNESIUM: CPT

## 2018-12-26 PROCEDURE — 84443 ASSAY THYROID STIM HORMONE: CPT

## 2018-12-26 PROCEDURE — 81001 URINALYSIS AUTO W/SCOPE: CPT

## 2018-12-26 PROCEDURE — 87486 CHLMYD PNEUM DNA AMP PROBE: CPT

## 2018-12-26 PROCEDURE — 84100 ASSAY OF PHOSPHORUS: CPT

## 2018-12-26 PROCEDURE — 97110 THERAPEUTIC EXERCISES: CPT

## 2018-12-26 PROCEDURE — 80048 BASIC METABOLIC PNL TOTAL CA: CPT

## 2018-12-26 PROCEDURE — 94640 AIRWAY INHALATION TREATMENT: CPT

## 2018-12-26 PROCEDURE — 87040 BLOOD CULTURE FOR BACTERIA: CPT

## 2018-12-26 PROCEDURE — 82009 KETONE BODYS QUAL: CPT

## 2018-12-26 PROCEDURE — 87086 URINE CULTURE/COLONY COUNT: CPT

## 2018-12-26 PROCEDURE — 87798 DETECT AGENT NOS DNA AMP: CPT

## 2018-12-26 PROCEDURE — 97162 PT EVAL MOD COMPLEX 30 MIN: CPT

## 2018-12-26 PROCEDURE — 90686 IIV4 VACC NO PRSV 0.5 ML IM: CPT

## 2018-12-26 PROCEDURE — 97116 GAIT TRAINING THERAPY: CPT

## 2018-12-26 PROCEDURE — 96372 THER/PROPH/DIAG INJ SC/IM: CPT

## 2018-12-26 PROCEDURE — 99285 EMERGENCY DEPT VISIT HI MDM: CPT | Mod: 25

## 2018-12-26 PROCEDURE — 86480 TB TEST CELL IMMUN MEASURE: CPT

## 2018-12-26 PROCEDURE — 80053 COMPREHEN METABOLIC PANEL: CPT

## 2018-12-26 PROCEDURE — 80061 LIPID PANEL: CPT

## 2018-12-26 PROCEDURE — 87633 RESP VIRUS 12-25 TARGETS: CPT

## 2018-12-26 PROCEDURE — 97530 THERAPEUTIC ACTIVITIES: CPT

## 2018-12-26 PROCEDURE — 82607 VITAMIN B-12: CPT

## 2018-12-26 PROCEDURE — 85027 COMPLETE CBC AUTOMATED: CPT

## 2018-12-26 PROCEDURE — 83036 HEMOGLOBIN GLYCOSYLATED A1C: CPT

## 2018-12-26 PROCEDURE — 71045 X-RAY EXAM CHEST 1 VIEW: CPT

## 2018-12-26 PROCEDURE — 87581 M.PNEUMON DNA AMP PROBE: CPT

## 2025-08-01 ENCOUNTER — EMERGENCY (EMERGENCY)
Facility: HOSPITAL | Age: 89
LOS: 1 days | End: 2025-08-01
Attending: EMERGENCY MEDICINE
Payer: MEDICARE

## 2025-08-01 VITALS
DIASTOLIC BLOOD PRESSURE: 74 MMHG | SYSTOLIC BLOOD PRESSURE: 114 MMHG | OXYGEN SATURATION: 90 % | RESPIRATION RATE: 16 BRPM | HEART RATE: 98 BPM | TEMPERATURE: 98 F

## 2025-08-01 VITALS
HEART RATE: 92 BPM | DIASTOLIC BLOOD PRESSURE: 76 MMHG | SYSTOLIC BLOOD PRESSURE: 116 MMHG | WEIGHT: 149.91 LBS | TEMPERATURE: 99 F | OXYGEN SATURATION: 94 % | RESPIRATION RATE: 18 BRPM

## 2025-08-01 PROBLEM — E11.9 TYPE 2 DIABETES MELLITUS WITHOUT COMPLICATIONS: Chronic | Status: ACTIVE | Noted: 2018-10-14

## 2025-08-01 LAB
ALBUMIN SERPL ELPH-MCNC: 2.9 G/DL — LOW (ref 3.5–5)
ALP SERPL-CCNC: 105 U/L — SIGNIFICANT CHANGE UP (ref 40–120)
ALT FLD-CCNC: 31 U/L DA — SIGNIFICANT CHANGE UP (ref 10–60)
ANION GAP SERPL CALC-SCNC: 4 MMOL/L — LOW (ref 5–17)
AST SERPL-CCNC: 35 U/L — SIGNIFICANT CHANGE UP (ref 10–40)
BASOPHILS # BLD AUTO: 0.01 K/UL — SIGNIFICANT CHANGE UP (ref 0–0.2)
BASOPHILS NFR BLD AUTO: 0.1 % — SIGNIFICANT CHANGE UP (ref 0–2)
BILIRUB SERPL-MCNC: 0.3 MG/DL — SIGNIFICANT CHANGE UP (ref 0.2–1.2)
BUN SERPL-MCNC: 23 MG/DL — HIGH (ref 7–18)
CALCIUM SERPL-MCNC: 9.6 MG/DL — SIGNIFICANT CHANGE UP (ref 8.4–10.5)
CHLORIDE SERPL-SCNC: 102 MMOL/L — SIGNIFICANT CHANGE UP (ref 96–108)
CO2 SERPL-SCNC: 28 MMOL/L — SIGNIFICANT CHANGE UP (ref 22–31)
CREAT SERPL-MCNC: 0.9 MG/DL — SIGNIFICANT CHANGE UP (ref 0.5–1.3)
EGFR: 61 ML/MIN/1.73M2 — SIGNIFICANT CHANGE UP
EGFR: 61 ML/MIN/1.73M2 — SIGNIFICANT CHANGE UP
EOSINOPHIL # BLD AUTO: 0.04 K/UL — SIGNIFICANT CHANGE UP (ref 0–0.5)
EOSINOPHIL NFR BLD AUTO: 0.4 % — SIGNIFICANT CHANGE UP (ref 0–6)
GLUCOSE SERPL-MCNC: 172 MG/DL — HIGH (ref 70–99)
HCT VFR BLD CALC: 43.7 % — SIGNIFICANT CHANGE UP (ref 34.5–45)
HGB BLD-MCNC: 14.3 G/DL — SIGNIFICANT CHANGE UP (ref 11.5–15.5)
IMM GRANULOCYTES NFR BLD AUTO: 0.3 % — SIGNIFICANT CHANGE UP (ref 0–0.9)
LYMPHOCYTES # BLD AUTO: 1.29 K/UL — SIGNIFICANT CHANGE UP (ref 1–3.3)
LYMPHOCYTES # BLD AUTO: 13.9 % — SIGNIFICANT CHANGE UP (ref 13–44)
MCHC RBC-ENTMCNC: 29 PG — SIGNIFICANT CHANGE UP (ref 27–34)
MCHC RBC-ENTMCNC: 32.7 G/DL — SIGNIFICANT CHANGE UP (ref 32–36)
MCV RBC AUTO: 88.6 FL — SIGNIFICANT CHANGE UP (ref 80–100)
MONOCYTES # BLD AUTO: 0.79 K/UL — SIGNIFICANT CHANGE UP (ref 0–0.9)
MONOCYTES NFR BLD AUTO: 8.5 % — SIGNIFICANT CHANGE UP (ref 2–14)
NEUTROPHILS # BLD AUTO: 7.09 K/UL — SIGNIFICANT CHANGE UP (ref 1.8–7.4)
NEUTROPHILS NFR BLD AUTO: 76.8 % — SIGNIFICANT CHANGE UP (ref 43–77)
NRBC BLD AUTO-RTO: 0 /100 WBCS — SIGNIFICANT CHANGE UP (ref 0–0)
PLATELET # BLD AUTO: 327 K/UL — SIGNIFICANT CHANGE UP (ref 150–400)
POTASSIUM SERPL-MCNC: 5 MMOL/L — SIGNIFICANT CHANGE UP (ref 3.5–5.3)
POTASSIUM SERPL-SCNC: 5 MMOL/L — SIGNIFICANT CHANGE UP (ref 3.5–5.3)
PROT SERPL-MCNC: 7.6 G/DL — SIGNIFICANT CHANGE UP (ref 6–8.3)
RBC # BLD: 4.93 M/UL — SIGNIFICANT CHANGE UP (ref 3.8–5.2)
RBC # FLD: 14.3 % — SIGNIFICANT CHANGE UP (ref 10.3–14.5)
SODIUM SERPL-SCNC: 134 MMOL/L — LOW (ref 135–145)
TROPONIN I, HIGH SENSITIVITY RESULT: 15 NG/L — SIGNIFICANT CHANGE UP
WBC # BLD: 9.25 K/UL — SIGNIFICANT CHANGE UP (ref 3.8–10.5)
WBC # FLD AUTO: 9.25 K/UL — SIGNIFICANT CHANGE UP (ref 3.8–10.5)

## 2025-08-01 PROCEDURE — 93010 ELECTROCARDIOGRAM REPORT: CPT

## 2025-08-01 PROCEDURE — 72125 CT NECK SPINE W/O DYE: CPT

## 2025-08-01 PROCEDURE — 70496 CT ANGIOGRAPHY HEAD: CPT | Mod: 26

## 2025-08-01 PROCEDURE — 70450 CT HEAD/BRAIN W/O DYE: CPT

## 2025-08-01 PROCEDURE — 84484 ASSAY OF TROPONIN QUANT: CPT

## 2025-08-01 PROCEDURE — 85025 COMPLETE CBC W/AUTO DIFF WBC: CPT

## 2025-08-01 PROCEDURE — 99285 EMERGENCY DEPT VISIT HI MDM: CPT

## 2025-08-01 PROCEDURE — 36415 COLL VENOUS BLD VENIPUNCTURE: CPT

## 2025-08-01 PROCEDURE — 80053 COMPREHEN METABOLIC PANEL: CPT

## 2025-08-01 PROCEDURE — 70450 CT HEAD/BRAIN W/O DYE: CPT | Mod: 26,59

## 2025-08-01 PROCEDURE — 99285 EMERGENCY DEPT VISIT HI MDM: CPT | Mod: 25

## 2025-08-01 PROCEDURE — 70496 CT ANGIOGRAPHY HEAD: CPT

## 2025-08-01 PROCEDURE — 96374 THER/PROPH/DIAG INJ IV PUSH: CPT | Mod: XU

## 2025-08-01 PROCEDURE — 72125 CT NECK SPINE W/O DYE: CPT | Mod: 26

## 2025-08-01 PROCEDURE — 96375 TX/PRO/DX INJ NEW DRUG ADDON: CPT | Mod: XU

## 2025-08-01 RX ORDER — ACETAMINOPHEN 500 MG/5ML
1000 LIQUID (ML) ORAL ONCE
Refills: 0 | Status: COMPLETED | OUTPATIENT
Start: 2025-08-01 | End: 2025-08-01

## 2025-08-01 RX ORDER — METOCLOPRAMIDE HCL 10 MG
10 TABLET ORAL ONCE
Refills: 0 | Status: COMPLETED | OUTPATIENT
Start: 2025-08-01 | End: 2025-08-01

## 2025-08-01 RX ORDER — KETOROLAC TROMETHAMINE 30 MG/ML
10 INJECTION, SOLUTION INTRAMUSCULAR; INTRAVENOUS ONCE
Refills: 0 | Status: DISCONTINUED | OUTPATIENT
Start: 2025-08-01 | End: 2025-08-01

## 2025-08-01 RX ADMIN — Medication 1000 MILLILITER(S): at 14:26

## 2025-08-01 RX ADMIN — KETOROLAC TROMETHAMINE 10 MILLIGRAM(S): 30 INJECTION, SOLUTION INTRAMUSCULAR; INTRAVENOUS at 19:41

## 2025-08-01 RX ADMIN — Medication 400 MILLIGRAM(S): at 14:25

## 2025-08-01 RX ADMIN — Medication 104 MILLIGRAM(S): at 14:25
